# Patient Record
Sex: FEMALE | Race: WHITE | NOT HISPANIC OR LATINO | URBAN - METROPOLITAN AREA
[De-identification: names, ages, dates, MRNs, and addresses within clinical notes are randomized per-mention and may not be internally consistent; named-entity substitution may affect disease eponyms.]

---

## 2020-04-24 ENCOUNTER — INPATIENT (INPATIENT)
Facility: HOSPITAL | Age: 75
LOS: 2 days | Discharge: NURSING, REHAB OR LONG-TERM CARE | DRG: 178 | End: 2020-04-27
Attending: INTERNAL MEDICINE | Admitting: INTERNAL MEDICINE
Payer: MEDICARE

## 2020-04-24 VITALS
RESPIRATION RATE: 18 BRPM | DIASTOLIC BLOOD PRESSURE: 59 MMHG | WEIGHT: 119.93 LBS | SYSTOLIC BLOOD PRESSURE: 93 MMHG | HEIGHT: 61 IN | OXYGEN SATURATION: 97 % | HEART RATE: 82 BPM | TEMPERATURE: 98 F

## 2020-04-24 DIAGNOSIS — F03.90 UNSPECIFIED DEMENTIA WITHOUT BEHAVIORAL DISTURBANCE: ICD-10-CM

## 2020-04-24 DIAGNOSIS — Z29.9 ENCOUNTER FOR PROPHYLACTIC MEASURES, UNSPECIFIED: ICD-10-CM

## 2020-04-24 DIAGNOSIS — I95.9 HYPOTENSION, UNSPECIFIED: ICD-10-CM

## 2020-04-24 DIAGNOSIS — U07.1 COVID-19: ICD-10-CM

## 2020-04-24 LAB
ALBUMIN SERPL ELPH-MCNC: 3.6 G/DL — SIGNIFICANT CHANGE UP (ref 3.5–5)
ALP SERPL-CCNC: 87 U/L — SIGNIFICANT CHANGE UP (ref 40–120)
ALT FLD-CCNC: 27 U/L DA — SIGNIFICANT CHANGE UP (ref 10–60)
ANION GAP SERPL CALC-SCNC: 3 MMOL/L — LOW (ref 5–17)
APPEARANCE UR: CLEAR — SIGNIFICANT CHANGE UP
AST SERPL-CCNC: 40 U/L — SIGNIFICANT CHANGE UP (ref 10–40)
BASOPHILS # BLD AUTO: 0.02 K/UL — SIGNIFICANT CHANGE UP (ref 0–0.2)
BASOPHILS NFR BLD AUTO: 0.4 % — SIGNIFICANT CHANGE UP (ref 0–2)
BILIRUB SERPL-MCNC: 0.3 MG/DL — SIGNIFICANT CHANGE UP (ref 0.2–1.2)
BILIRUB UR-MCNC: NEGATIVE — SIGNIFICANT CHANGE UP
BUN SERPL-MCNC: 14 MG/DL — SIGNIFICANT CHANGE UP (ref 7–18)
CALCIUM SERPL-MCNC: 8.9 MG/DL — SIGNIFICANT CHANGE UP (ref 8.4–10.5)
CHLORIDE SERPL-SCNC: 103 MMOL/L — SIGNIFICANT CHANGE UP (ref 96–108)
CO2 SERPL-SCNC: 32 MMOL/L — HIGH (ref 22–31)
COLOR SPEC: YELLOW — SIGNIFICANT CHANGE UP
CREAT SERPL-MCNC: 0.92 MG/DL — SIGNIFICANT CHANGE UP (ref 0.5–1.3)
D DIMER BLD IA.RAPID-MCNC: <150 NG/ML DDU — SIGNIFICANT CHANGE UP
DIFF PNL FLD: NEGATIVE — SIGNIFICANT CHANGE UP
EOSINOPHIL # BLD AUTO: 0 K/UL — SIGNIFICANT CHANGE UP (ref 0–0.5)
EOSINOPHIL NFR BLD AUTO: 0 % — SIGNIFICANT CHANGE UP (ref 0–6)
GLUCOSE SERPL-MCNC: 89 MG/DL — SIGNIFICANT CHANGE UP (ref 70–99)
GLUCOSE UR QL: NEGATIVE — SIGNIFICANT CHANGE UP
HCT VFR BLD CALC: 36.4 % — SIGNIFICANT CHANGE UP (ref 34.5–45)
HGB BLD-MCNC: 12.3 G/DL — SIGNIFICANT CHANGE UP (ref 11.5–15.5)
IMM GRANULOCYTES NFR BLD AUTO: 0.2 % — SIGNIFICANT CHANGE UP (ref 0–1.5)
KETONES UR-MCNC: NEGATIVE — SIGNIFICANT CHANGE UP
LEUKOCYTE ESTERASE UR-ACNC: NEGATIVE — SIGNIFICANT CHANGE UP
LYMPHOCYTES # BLD AUTO: 1.43 K/UL — SIGNIFICANT CHANGE UP (ref 1–3.3)
LYMPHOCYTES # BLD AUTO: 29.9 % — SIGNIFICANT CHANGE UP (ref 13–44)
MCHC RBC-ENTMCNC: 30.8 PG — SIGNIFICANT CHANGE UP (ref 27–34)
MCHC RBC-ENTMCNC: 33.8 GM/DL — SIGNIFICANT CHANGE UP (ref 32–36)
MCV RBC AUTO: 91 FL — SIGNIFICANT CHANGE UP (ref 80–100)
MONOCYTES # BLD AUTO: 0.51 K/UL — SIGNIFICANT CHANGE UP (ref 0–0.9)
MONOCYTES NFR BLD AUTO: 10.6 % — SIGNIFICANT CHANGE UP (ref 2–14)
NEUTROPHILS # BLD AUTO: 2.82 K/UL — SIGNIFICANT CHANGE UP (ref 1.8–7.4)
NEUTROPHILS NFR BLD AUTO: 58.9 % — SIGNIFICANT CHANGE UP (ref 43–77)
NITRITE UR-MCNC: NEGATIVE — SIGNIFICANT CHANGE UP
NRBC # BLD: 0 /100 WBCS — SIGNIFICANT CHANGE UP (ref 0–0)
PH UR: 7 — SIGNIFICANT CHANGE UP (ref 5–8)
PLATELET # BLD AUTO: 156 K/UL — SIGNIFICANT CHANGE UP (ref 150–400)
POTASSIUM SERPL-MCNC: 4.2 MMOL/L — SIGNIFICANT CHANGE UP (ref 3.5–5.3)
POTASSIUM SERPL-SCNC: 4.2 MMOL/L — SIGNIFICANT CHANGE UP (ref 3.5–5.3)
PROT SERPL-MCNC: 7.5 G/DL — SIGNIFICANT CHANGE UP (ref 6–8.3)
PROT UR-MCNC: NEGATIVE — SIGNIFICANT CHANGE UP
RBC # BLD: 4 M/UL — SIGNIFICANT CHANGE UP (ref 3.8–5.2)
RBC # FLD: 11.6 % — SIGNIFICANT CHANGE UP (ref 10.3–14.5)
SARS-COV-2 RNA SPEC QL NAA+PROBE: DETECTED
SODIUM SERPL-SCNC: 138 MMOL/L — SIGNIFICANT CHANGE UP (ref 135–145)
SP GR SPEC: 1 — LOW (ref 1.01–1.02)
UROBILINOGEN FLD QL: NEGATIVE — SIGNIFICANT CHANGE UP
WBC # BLD: 4.79 K/UL — SIGNIFICANT CHANGE UP (ref 3.8–10.5)
WBC # FLD AUTO: 4.79 K/UL — SIGNIFICANT CHANGE UP (ref 3.8–10.5)

## 2020-04-24 PROCEDURE — 93010 ELECTROCARDIOGRAM REPORT: CPT

## 2020-04-24 PROCEDURE — 71045 X-RAY EXAM CHEST 1 VIEW: CPT | Mod: 26

## 2020-04-24 PROCEDURE — 99285 EMERGENCY DEPT VISIT HI MDM: CPT | Mod: CS

## 2020-04-24 RX ORDER — SODIUM CHLORIDE 9 MG/ML
500 INJECTION INTRAMUSCULAR; INTRAVENOUS; SUBCUTANEOUS ONCE
Refills: 0 | Status: COMPLETED | OUTPATIENT
Start: 2020-04-24 | End: 2020-04-24

## 2020-04-24 RX ORDER — TRAZODONE HCL 50 MG
50 TABLET ORAL EVERY 12 HOURS
Refills: 0 | Status: DISCONTINUED | OUTPATIENT
Start: 2020-04-24 | End: 2020-04-27

## 2020-04-24 RX ORDER — CHOLECALCIFEROL (VITAMIN D3) 125 MCG
1000 CAPSULE ORAL DAILY
Refills: 0 | Status: DISCONTINUED | OUTPATIENT
Start: 2020-04-24 | End: 2020-04-27

## 2020-04-24 RX ORDER — TRAZODONE HCL 50 MG
50 TABLET ORAL ONCE
Refills: 0 | Status: COMPLETED | OUTPATIENT
Start: 2020-04-24 | End: 2020-04-24

## 2020-04-24 RX ORDER — MIRTAZAPINE 45 MG/1
45 TABLET, ORALLY DISINTEGRATING ORAL AT BEDTIME
Refills: 0 | Status: DISCONTINUED | OUTPATIENT
Start: 2020-04-24 | End: 2020-04-27

## 2020-04-24 RX ORDER — ENOXAPARIN SODIUM 100 MG/ML
40 INJECTION SUBCUTANEOUS DAILY
Refills: 0 | Status: DISCONTINUED | OUTPATIENT
Start: 2020-04-24 | End: 2020-04-27

## 2020-04-24 RX ORDER — HALOPERIDOL DECANOATE 100 MG/ML
1 INJECTION INTRAMUSCULAR ONCE
Refills: 0 | Status: COMPLETED | OUTPATIENT
Start: 2020-04-24 | End: 2020-04-24

## 2020-04-24 RX ORDER — FLUVOXAMINE MALEATE 25 MG/1
25 TABLET ORAL AT BEDTIME
Refills: 0 | Status: DISCONTINUED | OUTPATIENT
Start: 2020-04-24 | End: 2020-04-27

## 2020-04-24 RX ORDER — LEVOTHYROXINE SODIUM 125 MCG
25 TABLET ORAL DAILY
Refills: 0 | Status: DISCONTINUED | OUTPATIENT
Start: 2020-04-24 | End: 2020-04-27

## 2020-04-24 RX ORDER — ASCORBIC ACID 60 MG
500 TABLET,CHEWABLE ORAL DAILY
Refills: 0 | Status: DISCONTINUED | OUTPATIENT
Start: 2020-04-24 | End: 2020-04-27

## 2020-04-24 RX ADMIN — Medication 0.5 MILLIGRAM(S): at 16:57

## 2020-04-24 RX ADMIN — Medication 10 MILLIGRAM(S): at 22:59

## 2020-04-24 RX ADMIN — HALOPERIDOL DECANOATE 1 MILLIGRAM(S): 100 INJECTION INTRAMUSCULAR at 18:30

## 2020-04-24 RX ADMIN — MIRTAZAPINE 45 MILLIGRAM(S): 45 TABLET, ORALLY DISINTEGRATING ORAL at 22:59

## 2020-04-24 RX ADMIN — FLUVOXAMINE MALEATE 25 MILLIGRAM(S): 25 TABLET ORAL at 22:59

## 2020-04-24 RX ADMIN — Medication 50 MILLIGRAM(S): at 16:57

## 2020-04-24 RX ADMIN — SODIUM CHLORIDE 500 MILLILITER(S): 9 INJECTION INTRAMUSCULAR; INTRAVENOUS; SUBCUTANEOUS at 16:20

## 2020-04-24 NOTE — H&P ADULT - HISTORY OF PRESENT ILLNESS
Patient is a 74 yr old sent from assisted living for cough. Patient unable to provide ROS Patient is a 74 yr old female from [facility] sent in for evaluation for fever and cough. As per NH home pt has been having cough and cxr with left lower lobe infiltrate. Pt agitated in ED, unable to provide any history. Pt found to be covid positive in ED, but cxr negative for any infiltrate. In ED, Pt vitals were  Temp 97  HR 82  BP 93/59  RR 18/97

## 2020-04-24 NOTE — H&P ADULT - PROBLEM SELECTOR PLAN 2
Pt noted to be hypotensive on admission  s/p IVF BP improved  Encourage increased po intake  Pt clinically appears dehydrated  c/w IVF for 12 hours and reaccess in am

## 2020-04-24 NOTE — ED ADULT NURSE NOTE - NSIMPLEMENTINTERV_GEN_ALL_ED
Implemented All Fall with Harm Risk Interventions:  [place] to call system. Call bell, personal items and telephone within reach. Instruct patient to call for assistance. Room bathroom lighting operational. Non-slip footwear when patient is off stretcher. Physically safe environment: no spills, clutter or unnecessary equipment. Stretcher in lowest position, wheels locked, appropriate side rails in place. Provide visual cue, wrist band, yellow gown, etc. Monitor gait and stability. Monitor for mental status changes and reorient to person, place, and time. Review medications for side effects contributing to fall risk. Reinforce activity limits and safety measures with patient and family. Provide visual clues: red socks.

## 2020-04-24 NOTE — H&P ADULT - ASSESSMENT
Patient is a 74 yr old female from [facility] living sent in for evaluation for fever and cough. As per NH home pt has been having cough and cxr with left lower lobe infiltrate. Pt agitated in ED, unable to provide any history. Pt found to be covid positive in ED, but cxr negative for any infiltrate. In ED, Pt vitals were  Temp 97  HR 82  BP 93/59  RR 18/97

## 2020-04-24 NOTE — H&P ADULT - PROBLEM SELECTOR PLAN 1
Pt sent in for evaluation of cough  cxr negative for any infiltrate, pt currently saturating 98% on RA  F/u inflamatory markers in am  Holding plaquinel at present as no signs of hypoxia

## 2020-04-24 NOTE — ED PROVIDER NOTE - CARE PLAN
Principal Discharge DX:	Hypotension, unspecified hypotension type  Secondary Diagnosis:	Upper respiratory tract infection, unspecified type

## 2020-04-24 NOTE — H&P ADULT - NSHPPHYSICALEXAM_GEN_ALL_CORE
PHYSICAL EXAM:  GENERAL: No acute distess  HEAD:  Atraumatic, Normocephalic  EYES:  conjunctiva and sclera clear  NECK: Supple, No JVD  CHEST/LUNG: Clear to auscultation bilaterally; No wheeze; No crackles; No accessory muscles used  HEART: Regular rate and rhythm; No murmurs;   ABDOMEN: Soft, Nontender, Nondistended; Bowel sounds present; No guarding  EXTREMITIES:  1+ Peripheral Pulses, No cyanosis or edema  PSYCH: Confused  NEUROLOGY: non-focal  SKIN: No rashes or lesions

## 2020-04-24 NOTE — ED ADULT NURSE NOTE - NSSUHOSCREENINGYN_ED_ALL_ED
Patient will be participating in weekly tobacco cessation meetings and will begin the prescribed tobacco cessation medication regimen of 21 mg patches. FTND of 5 indicates a moderate dependence to tobacco. NATALIE-D of 10 is perceived as no mental distress or depression at this time. 
No - the patient is unable to be screened due to medical condition

## 2020-04-24 NOTE — H&P ADULT - ATTENDING COMMENTS
seen and examined in er d/w er md  brought in cough and fever  in er, pt is confused sating well on RA  physical done  confused  has behavioural issues   covid pos  will watch

## 2020-04-25 LAB
ALBUMIN SERPL ELPH-MCNC: 3.3 G/DL — LOW (ref 3.5–5)
ALP SERPL-CCNC: 88 U/L — SIGNIFICANT CHANGE UP (ref 40–120)
ALT FLD-CCNC: 23 U/L DA — SIGNIFICANT CHANGE UP (ref 10–60)
ANION GAP SERPL CALC-SCNC: 7 MMOL/L — SIGNIFICANT CHANGE UP (ref 5–17)
AST SERPL-CCNC: 37 U/L — SIGNIFICANT CHANGE UP (ref 10–40)
BILIRUB SERPL-MCNC: 0.4 MG/DL — SIGNIFICANT CHANGE UP (ref 0.2–1.2)
BUN SERPL-MCNC: 9 MG/DL — SIGNIFICANT CHANGE UP (ref 7–18)
CALCIUM SERPL-MCNC: 8.5 MG/DL — SIGNIFICANT CHANGE UP (ref 8.4–10.5)
CHLORIDE SERPL-SCNC: 106 MMOL/L — SIGNIFICANT CHANGE UP (ref 96–108)
CHOLEST SERPL-MCNC: 130 MG/DL — SIGNIFICANT CHANGE UP (ref 10–199)
CO2 SERPL-SCNC: 25 MMOL/L — SIGNIFICANT CHANGE UP (ref 22–31)
CREAT SERPL-MCNC: 0.74 MG/DL — SIGNIFICANT CHANGE UP (ref 0.5–1.3)
CRP SERPL-MCNC: 0.57 MG/DL — HIGH (ref 0–0.4)
CULTURE RESULTS: SIGNIFICANT CHANGE UP
FERRITIN SERPL-MCNC: 276 NG/ML — HIGH (ref 15–150)
FERRITIN SERPL-MCNC: 291 NG/ML — HIGH (ref 15–150)
GLUCOSE SERPL-MCNC: SIGNIFICANT CHANGE UP MG/DL (ref 70–99)
HCT VFR BLD CALC: 37.9 % — SIGNIFICANT CHANGE UP (ref 34.5–45)
HCV AB S/CO SERPL IA: 0.13 S/CO — SIGNIFICANT CHANGE UP (ref 0–0.99)
HCV AB SERPL-IMP: SIGNIFICANT CHANGE UP
HDLC SERPL-MCNC: 40 MG/DL — LOW
HGB BLD-MCNC: 12.5 G/DL — SIGNIFICANT CHANGE UP (ref 11.5–15.5)
LIPID PNL WITH DIRECT LDL SERPL: 63 MG/DL — SIGNIFICANT CHANGE UP
MAGNESIUM SERPL-MCNC: 3.8 MG/DL — HIGH (ref 1.6–2.6)
MAGNESIUM SERPL-MCNC: <0.3 MG/DL — CRITICAL LOW (ref 1.6–2.6)
MCHC RBC-ENTMCNC: 30.1 PG — SIGNIFICANT CHANGE UP (ref 27–34)
MCHC RBC-ENTMCNC: 33 GM/DL — SIGNIFICANT CHANGE UP (ref 32–36)
MCV RBC AUTO: 91.3 FL — SIGNIFICANT CHANGE UP (ref 80–100)
NRBC # BLD: 0 /100 WBCS — SIGNIFICANT CHANGE UP (ref 0–0)
PHOSPHATE SERPL-MCNC: SIGNIFICANT CHANGE UP MG/DL (ref 2.5–4.5)
PLATELET # BLD AUTO: 126 K/UL — LOW (ref 150–400)
POTASSIUM SERPL-MCNC: 3.9 MMOL/L — SIGNIFICANT CHANGE UP (ref 3.5–5.3)
POTASSIUM SERPL-SCNC: 3.9 MMOL/L — SIGNIFICANT CHANGE UP (ref 3.5–5.3)
PROCALCITONIN SERPL-MCNC: 0.09 NG/ML — SIGNIFICANT CHANGE UP (ref 0.02–0.1)
PROCALCITONIN SERPL-MCNC: 0.09 NG/ML — SIGNIFICANT CHANGE UP (ref 0.02–0.1)
PROT SERPL-MCNC: 7 G/DL — SIGNIFICANT CHANGE UP (ref 6–8.3)
RBC # BLD: 4.15 M/UL — SIGNIFICANT CHANGE UP (ref 3.8–5.2)
RBC # FLD: 11.6 % — SIGNIFICANT CHANGE UP (ref 10.3–14.5)
SODIUM SERPL-SCNC: 138 MMOL/L — SIGNIFICANT CHANGE UP (ref 135–145)
SPECIMEN SOURCE: SIGNIFICANT CHANGE UP
TOTAL CHOLESTEROL/HDL RATIO MEASUREMENT: 3.2 RATIO — LOW (ref 3.3–7.1)
TRIGL SERPL-MCNC: 134 MG/DL — SIGNIFICANT CHANGE UP (ref 10–149)
TSH SERPL-MCNC: 1.57 UU/ML — SIGNIFICANT CHANGE UP (ref 0.34–4.82)
TSH SERPL-MCNC: 1.63 UU/ML — SIGNIFICANT CHANGE UP (ref 0.34–4.82)
WBC # BLD: 2.99 K/UL — LOW (ref 3.8–10.5)
WBC # FLD AUTO: 2.99 K/UL — LOW (ref 3.8–10.5)

## 2020-04-25 RX ORDER — LANOLIN ALCOHOL/MO/W.PET/CERES
3 CREAM (GRAM) TOPICAL AT BEDTIME
Refills: 0 | Status: DISCONTINUED | OUTPATIENT
Start: 2020-04-25 | End: 2020-04-27

## 2020-04-25 RX ORDER — HALOPERIDOL DECANOATE 100 MG/ML
0.5 INJECTION INTRAMUSCULAR ONCE
Refills: 0 | Status: COMPLETED | OUTPATIENT
Start: 2020-04-25 | End: 2020-04-25

## 2020-04-25 RX ORDER — MAGNESIUM OXIDE 400 MG ORAL TABLET 241.3 MG
400 TABLET ORAL DAILY
Refills: 0 | Status: DISCONTINUED | OUTPATIENT
Start: 2020-04-25 | End: 2020-04-25

## 2020-04-25 RX ORDER — MAGNESIUM SULFATE 500 MG/ML
2 VIAL (ML) INJECTION
Refills: 0 | Status: COMPLETED | OUTPATIENT
Start: 2020-04-25 | End: 2020-04-25

## 2020-04-25 RX ORDER — LANOLIN ALCOHOL/MO/W.PET/CERES
5 CREAM (GRAM) TOPICAL ONCE
Refills: 0 | Status: DISCONTINUED | OUTPATIENT
Start: 2020-04-25 | End: 2020-04-25

## 2020-04-25 RX ORDER — HALOPERIDOL DECANOATE 100 MG/ML
1 INJECTION INTRAMUSCULAR ONCE
Refills: 0 | Status: COMPLETED | OUTPATIENT
Start: 2020-04-25 | End: 2020-04-25

## 2020-04-25 RX ADMIN — Medication 10 MILLIGRAM(S): at 05:42

## 2020-04-25 RX ADMIN — ENOXAPARIN SODIUM 40 MILLIGRAM(S): 100 INJECTION SUBCUTANEOUS at 11:49

## 2020-04-25 RX ADMIN — Medication 50 MILLIGRAM(S): at 05:42

## 2020-04-25 RX ADMIN — HALOPERIDOL DECANOATE 0.5 MILLIGRAM(S): 100 INJECTION INTRAMUSCULAR at 00:53

## 2020-04-25 RX ADMIN — Medication 10 MILLIGRAM(S): at 21:48

## 2020-04-25 RX ADMIN — MIRTAZAPINE 45 MILLIGRAM(S): 45 TABLET, ORALLY DISINTEGRATING ORAL at 21:48

## 2020-04-25 RX ADMIN — FLUVOXAMINE MALEATE 25 MILLIGRAM(S): 25 TABLET ORAL at 21:47

## 2020-04-25 RX ADMIN — Medication 10 MILLIGRAM(S): at 14:47

## 2020-04-25 RX ADMIN — Medication 500 MILLIGRAM(S): at 11:48

## 2020-04-25 RX ADMIN — MAGNESIUM OXIDE 400 MG ORAL TABLET 400 MILLIGRAM(S): 241.3 TABLET ORAL at 11:48

## 2020-04-25 RX ADMIN — Medication 50 GRAM(S): at 09:17

## 2020-04-25 RX ADMIN — Medication 25 MICROGRAM(S): at 05:42

## 2020-04-25 RX ADMIN — Medication 50 MILLIGRAM(S): at 20:55

## 2020-04-25 RX ADMIN — Medication 50 GRAM(S): at 10:34

## 2020-04-25 RX ADMIN — HALOPERIDOL DECANOATE 1 MILLIGRAM(S): 100 INJECTION INTRAMUSCULAR at 16:16

## 2020-04-25 RX ADMIN — Medication 3 MILLIGRAM(S): at 21:48

## 2020-04-25 RX ADMIN — Medication 1000 UNIT(S): at 11:49

## 2020-04-25 NOTE — PROGRESS NOTE ADULT - SUBJECTIVE AND OBJECTIVE BOX
HPI:  Patient is a 74 yr old female from [facility] living sent in for evaluation for fever and cough. As per NH home pt has been having cough and cxr with left lower lobe infiltrate. Pt agitated in ED, unable to provide any history. Pt found to be covid positive in ED, but cxr negative for any infiltrate. In ED, Pt vitals were  Temp 97  HR 82  BP 93/59  RR 18/97 (2020 20:36)      Patient is a 74y old  Female who presents with a chief complaint of Cough (2020 20:36)      INTERVAL HPI/OVERNIGHT EVENTS:  T(C): 36.2 (--20 @ 14:16), Max: 36.6 (-20 @ 22:16)  HR: 90 (-25-20 @ 14:16) (67 - 90)  BP: 113/59 (-25-20 @ 14:16) (101/62 - 122/61)  RR: 18 (-25-20 @ 14:16) (16 - 18)  SpO2: 96% (--20 @ 14:16) (96% - 99%)  Wt(kg): --  I&O's Summary      REVIEW OF SYSTEMS: denies fever, chills, SOB, palpitations, chest pain, abdominal pain, nausea, vomitting, diarrhea, constipation, dizziness    MEDICATIONS  (STANDING):  ascorbic acid 500 milliGRAM(s) Oral daily  busPIRone 10 milliGRAM(s) Oral three times a day  cholecalciferol 1000 Unit(s) Oral daily  enoxaparin Injectable 40 milliGRAM(s) SubCutaneous daily  fluvoxaMINE 25 milliGRAM(s) Oral at bedtime  levothyroxine 25 MICROGram(s) Oral daily  melatonin 3 milliGRAM(s) Oral at bedtime  mirtazapine 45 milliGRAM(s) Oral at bedtime  traZODone 50 milliGRAM(s) Oral every 12 hours    MEDICATIONS  (PRN):      PHYSICAL EXAM:  GENERAL: NAD, well-groomed, well-developed  HEAD:  Atraumatic, Normocephalic  EYES: EOMI, PERRLA, conjunctiva and sclera clear  ENMT: No tonsillar erythema, exudates, or enlargement; Moist mucous membranes, Good dentition, No lesions  NECK: Supple, No JVD, Normal thyroid  NERVOUS SYSTEM:  Alert & Oriented X3, Good concentration; Motor Strength 5/5 B/L upper and lower extremities; DTRs 2+ intact and symmetric  CHEST/LUNG: Clear to percussion bilaterally; No rales, rhonchi, wheezing, or rubs  HEART: Regular rate and rhythm; No murmurs, rubs, or gallops  ABDOMEN: Soft, Nontender, Nondistended; Bowel sounds present  EXTREMITIES:  2+ Peripheral Pulses, No clubbing, cyanosis, or edema  LYMPH: No lymphadenopathy noted  SKIN: No rashes or lesions  LABS:                        12.5   2.99  )-----------( 126      ( 2020 07:32 )             37.9     04-25    138  |  106  |  9   --------------   --------------<  QNS  3.9   |  25  |  0.74    Ca    8.5      2020 07:32  Phos  QNS     04-25  Mg     3.8     04-25    TPro  7.0  /  Alb  3.3<L>  /  TBili  0.4  /  DBili  x   /  AST  37  /  ALT  23  /  AlkPhos  88  04-25      Urinalysis Basic - ( 2020 16:32 )    Color: Yellow / Appearance: Clear / S.005 / pH: x  Gluc: x / Ketone: Negative  / Bili: Negative / Urobili: Negative   Blood: x / Protein: Negative / Nitrite: Negative   Leuk Esterase: Negative / RBC: x / WBC x   Sq Epi: x / Non Sq Epi: x / Bacteria: x      CAPILLARY BLOOD GLUCOSE            Urinalysis Basic - ( 2020 16:32 )    Color: Yellow / Appearance: Clear / S.005 / pH: x  Gluc: x / Ketone: Negative  / Bili: Negative / Urobili: Negative   Blood: x / Protein: Negative / Nitrite: Negative   Leuk Esterase: Negative / RBC: x / WBC x   Sq Epi: x / Non Sq Epi: x / Bacteria: x

## 2020-04-25 NOTE — CHART NOTE - NSCHARTNOTEFT_GEN_A_CORE
Event:  Called by RN this afternoon, patient very agitated and climbing out of bed.     Brief HPI:  74 yr old female with history of dementia, from [facility] sent in for evaluation for fever and cough. Pt found to be covid positive in ED.  Patient 02 sat >94% on roomair.  Afebrile.     Chest Xray 04/24/2020    INTERPRETATION:    Examination: XR CHES  Findings:  The lungs are clear.  Heart size andmediastinum unremarkable. Trachea midline.  Osseous thorax intact.  Impression:  1.  No acute cardiopulmonary radiographic chest pathology.        Assessment:  Patient Alert to person only.  Restless and agitated, trying to get out of bed. Tried to calm and reorient patient but she is confused.     Plan:   -Bed alarm / fall precautions   -Haldol 1 mg IVP x 1 dose ordered  -B/L wrist restraints for safety, high risk for fall.  Re-assess need for restraint after Haldol given.  -Continue current psych meds from home (Trazadone, Remeron, Buspar)

## 2020-04-25 NOTE — CHART NOTE - NSCHARTNOTEFT_GEN_A_CORE
EVENT: Poor safety awareness, climbing OOB with side rails up, confused    BRIEF HPI:74 yr old female from [facility] living sent in for evaluation for fever and cough. As per NH home pt has been having cough and cxr with left lower lobe infiltrate. Pt agitated in ED, unable to provide any history. Pt found to be covid positive, cxr negative for any infiltrate. Holding Plaquenil at present as no signs of hypoxia.Now climbing OOB with no safety awareness.    Vital Signs Last 24 Hrs  T(C): 36.6 (24 Apr 2020 22:16), Max: 36.6 (24 Apr 2020 22:16)  T(F): 97.8 (24 Apr 2020 22:16), Max: 97.8 (24 Apr 2020 22:16)  HR: 67 (24 Apr 2020 22:16) (67 - 82)  BP: 122/61 (24 Apr 2020 22:16) (93/59 - 122/61)  BP(mean): --  RR: 18 (24 Apr 2020 22:16) (16 - 18)  SpO2: 99% (24 Apr 2020 22:16) (97% - 99%)    BRIEF ASSESSMENT  NEURO: Confused climbing OOB    MEDICATIONS  (STANDING):  ascorbic acid 500 milliGRAM(s) Oral daily  busPIRone 10 milliGRAM(s) Oral three times a day  cholecalciferol 1000 Unit(s) Oral daily  enoxaparin Injectable 40 milliGRAM(s) SubCutaneous daily  fluvoxaMINE 25 milliGRAM(s) Oral at bedtime  haloperidol    Injectable 0.5 milliGRAM(s) IV Push once  levothyroxine 25 MICROGram(s) Oral daily    mirtazapine 45 milliGRAM(s) Oral at bedtime  traZODone 50 milliGRAM(s) Oral every 12 hours    MEDICATIONS  (PRN): EVENT: Called by nurse to assess pt for poor safety awareness, climbing OOB with side rails up, confused    BRIEF HPI:74 yr old female from [facility] sent in for evaluation for fever and cough. As per NH home pt has been having cough and cxr with left lower lobe infiltrate. Pt agitated in ED, unable to provide any history. Pt found to be covid positive, cxr negative for any infiltrate. Holding Plaquenil at present as no signs of hypoxia. Now climbing OOB with no safety awareness.    Vital Signs Last 24 Hrs  T(C): 36.6 (24 Apr 2020 22:16), Max: 36.6 (24 Apr 2020 22:16)  T(F): 97.8 (24 Apr 2020 22:16), Max: 97.8 (24 Apr 2020 22:16)  HR: 67 (24 Apr 2020 22:16) (67 - 82)  BP: 122/61 (24 Apr 2020 22:16) (93/59 - 122/61)  BP(mean): --  RR: 18 (24 Apr 2020 22:16) (16 - 18)  SpO2: 99% (24 Apr 2020 22:16) (97% - 99%)    BRIEF ASSESSMENT  NEURO: Confused, climbing OOB  EXT: AROM, unsteady gait                          12.3   4.79  )-----------( 156      ( 24 Apr 2020 16:12 )             36.4       04-24    138  |  103  |  14  ----------------------------<  89  4.2   |  32<H>  |  0.92    Ca    8.9      24 Apr 2020 16:12    TPro  7.5  /  Alb  3.6  /  TBili  0.3  /  DBili  x   /  AST  40  /  ALT  27  /  AlkPhos  87  04-24    EKG: NSR  QTc 437 ms    COVID-19 PCR: Detected:  (04.24.20 @ 16:15)    EXAM:  XR CHEST AP OR PA 1V                        PROCEDURE DATE:  04/24/2020    INTERPRETATION:    Examination: XR CHEST  History: , Shortness of Breath, Cough,  Fever  Comparison: None  Findings:  The lungs are clear.  Heart size and mediastinum unremarkable. Trachea midline.  Osseous thorax intact.  Impression:  1.  No acute cardiopulmonary radiographic chest pathology.    PROBLEM: Agitation probably due to dementia  PLAN  1. Haloperidol Injectable 0.5 alvarado GRAM(s) IV Push once ordered  2. Cont buspirone 10 alvarado GRAM(s) Oral three times a day  3. Cont fluvoxamine 25 alvarado GRAM(s) Oral at bedtime  4. Cont mirtazapine 45 alvarado GRAM(s) Oral at bedtime  5. Cont trazodone 50 alvarado GRAM(s) Oral every 12 hours

## 2020-04-26 LAB
ALBUMIN SERPL ELPH-MCNC: 3.8 G/DL — SIGNIFICANT CHANGE UP (ref 3.5–5)
ALP SERPL-CCNC: 101 U/L — SIGNIFICANT CHANGE UP (ref 40–120)
ALT FLD-CCNC: 31 U/L DA — SIGNIFICANT CHANGE UP (ref 10–60)
ANION GAP SERPL CALC-SCNC: 9 MMOL/L — SIGNIFICANT CHANGE UP (ref 5–17)
AST SERPL-CCNC: 66 U/L — HIGH (ref 10–40)
BILIRUB SERPL-MCNC: 0.4 MG/DL — SIGNIFICANT CHANGE UP (ref 0.2–1.2)
BUN SERPL-MCNC: 12 MG/DL — SIGNIFICANT CHANGE UP (ref 7–18)
CALCIUM SERPL-MCNC: 8.8 MG/DL — SIGNIFICANT CHANGE UP (ref 8.4–10.5)
CHLORIDE SERPL-SCNC: 105 MMOL/L — SIGNIFICANT CHANGE UP (ref 96–108)
CO2 SERPL-SCNC: 25 MMOL/L — SIGNIFICANT CHANGE UP (ref 22–31)
CREAT SERPL-MCNC: 0.85 MG/DL — SIGNIFICANT CHANGE UP (ref 0.5–1.3)
GLUCOSE SERPL-MCNC: 124 MG/DL — HIGH (ref 70–99)
HCT VFR BLD CALC: 38.7 % — SIGNIFICANT CHANGE UP (ref 34.5–45)
HGB BLD-MCNC: 13.2 G/DL — SIGNIFICANT CHANGE UP (ref 11.5–15.5)
MAGNESIUM SERPL-MCNC: 2.6 MG/DL — SIGNIFICANT CHANGE UP (ref 1.6–2.6)
MCHC RBC-ENTMCNC: 29.8 PG — SIGNIFICANT CHANGE UP (ref 27–34)
MCHC RBC-ENTMCNC: 34.1 GM/DL — SIGNIFICANT CHANGE UP (ref 32–36)
MCV RBC AUTO: 87.4 FL — SIGNIFICANT CHANGE UP (ref 80–100)
NRBC # BLD: 0 /100 WBCS — SIGNIFICANT CHANGE UP (ref 0–0)
PHOSPHATE SERPL-MCNC: 2.2 MG/DL — LOW (ref 2.5–4.5)
PLATELET # BLD AUTO: 146 K/UL — LOW (ref 150–400)
POTASSIUM SERPL-MCNC: 3.6 MMOL/L — SIGNIFICANT CHANGE UP (ref 3.5–5.3)
POTASSIUM SERPL-SCNC: 3.6 MMOL/L — SIGNIFICANT CHANGE UP (ref 3.5–5.3)
PROT SERPL-MCNC: 7.8 G/DL — SIGNIFICANT CHANGE UP (ref 6–8.3)
RBC # BLD: 4.43 M/UL — SIGNIFICANT CHANGE UP (ref 3.8–5.2)
RBC # FLD: 11.4 % — SIGNIFICANT CHANGE UP (ref 10.3–14.5)
SODIUM SERPL-SCNC: 139 MMOL/L — SIGNIFICANT CHANGE UP (ref 135–145)
WBC # BLD: 6.72 K/UL — SIGNIFICANT CHANGE UP (ref 3.8–10.5)
WBC # FLD AUTO: 6.72 K/UL — SIGNIFICANT CHANGE UP (ref 3.8–10.5)

## 2020-04-26 RX ORDER — SODIUM CHLORIDE 9 MG/ML
1000 INJECTION INTRAMUSCULAR; INTRAVENOUS; SUBCUTANEOUS
Refills: 0 | Status: DISCONTINUED | OUTPATIENT
Start: 2020-04-26 | End: 2020-04-27

## 2020-04-26 RX ADMIN — Medication 50 MILLIGRAM(S): at 17:24

## 2020-04-26 RX ADMIN — Medication 10 MILLIGRAM(S): at 06:20

## 2020-04-26 RX ADMIN — SODIUM CHLORIDE 75 MILLILITER(S): 9 INJECTION INTRAMUSCULAR; INTRAVENOUS; SUBCUTANEOUS at 12:50

## 2020-04-26 RX ADMIN — Medication 1000 UNIT(S): at 10:55

## 2020-04-26 RX ADMIN — Medication 500 MILLIGRAM(S): at 10:55

## 2020-04-26 RX ADMIN — Medication 25 MICROGRAM(S): at 06:20

## 2020-04-26 RX ADMIN — Medication 10 MILLIGRAM(S): at 14:18

## 2020-04-26 RX ADMIN — Medication 10 MILLIGRAM(S): at 21:53

## 2020-04-26 RX ADMIN — MIRTAZAPINE 45 MILLIGRAM(S): 45 TABLET, ORALLY DISINTEGRATING ORAL at 21:52

## 2020-04-26 RX ADMIN — Medication 50 MILLIGRAM(S): at 06:20

## 2020-04-26 RX ADMIN — Medication 3 MILLIGRAM(S): at 21:53

## 2020-04-26 RX ADMIN — ENOXAPARIN SODIUM 40 MILLIGRAM(S): 100 INJECTION SUBCUTANEOUS at 10:55

## 2020-04-26 RX ADMIN — FLUVOXAMINE MALEATE 25 MILLIGRAM(S): 25 TABLET ORAL at 21:52

## 2020-04-27 VITALS
OXYGEN SATURATION: 98 % | RESPIRATION RATE: 18 BRPM | SYSTOLIC BLOOD PRESSURE: 137 MMHG | DIASTOLIC BLOOD PRESSURE: 79 MMHG | HEART RATE: 100 BPM | TEMPERATURE: 98 F

## 2020-04-27 LAB
ALBUMIN SERPL ELPH-MCNC: 3.2 G/DL — LOW (ref 3.5–5)
ALP SERPL-CCNC: 86 U/L — SIGNIFICANT CHANGE UP (ref 40–120)
ALT FLD-CCNC: 40 U/L DA — SIGNIFICANT CHANGE UP (ref 10–60)
ANION GAP SERPL CALC-SCNC: 11 MMOL/L — SIGNIFICANT CHANGE UP (ref 5–17)
AST SERPL-CCNC: 115 U/L — HIGH (ref 10–40)
BILIRUB SERPL-MCNC: 0.5 MG/DL — SIGNIFICANT CHANGE UP (ref 0.2–1.2)
BUN SERPL-MCNC: 13 MG/DL — SIGNIFICANT CHANGE UP (ref 7–18)
CALCIUM SERPL-MCNC: 8.4 MG/DL — SIGNIFICANT CHANGE UP (ref 8.4–10.5)
CHLORIDE SERPL-SCNC: 107 MMOL/L — SIGNIFICANT CHANGE UP (ref 96–108)
CO2 SERPL-SCNC: 23 MMOL/L — SIGNIFICANT CHANGE UP (ref 22–31)
CREAT SERPL-MCNC: 0.6 MG/DL — SIGNIFICANT CHANGE UP (ref 0.5–1.3)
GLUCOSE SERPL-MCNC: 97 MG/DL — SIGNIFICANT CHANGE UP (ref 70–99)
HCT VFR BLD CALC: 40.5 % — SIGNIFICANT CHANGE UP (ref 34.5–45)
HGB BLD-MCNC: 13.7 G/DL — SIGNIFICANT CHANGE UP (ref 11.5–15.5)
MAGNESIUM SERPL-MCNC: 2.4 MG/DL — SIGNIFICANT CHANGE UP (ref 1.6–2.6)
MCHC RBC-ENTMCNC: 29.6 PG — SIGNIFICANT CHANGE UP (ref 27–34)
MCHC RBC-ENTMCNC: 33.8 GM/DL — SIGNIFICANT CHANGE UP (ref 32–36)
MCV RBC AUTO: 87.5 FL — SIGNIFICANT CHANGE UP (ref 80–100)
NRBC # BLD: 0 /100 WBCS — SIGNIFICANT CHANGE UP (ref 0–0)
PHOSPHATE SERPL-MCNC: 2.2 MG/DL — LOW (ref 2.5–4.5)
PLATELET # BLD AUTO: 131 K/UL — LOW (ref 150–400)
POTASSIUM SERPL-MCNC: 3.3 MMOL/L — LOW (ref 3.5–5.3)
POTASSIUM SERPL-SCNC: 3.3 MMOL/L — LOW (ref 3.5–5.3)
PROT SERPL-MCNC: 7.2 G/DL — SIGNIFICANT CHANGE UP (ref 6–8.3)
RBC # BLD: 4.63 M/UL — SIGNIFICANT CHANGE UP (ref 3.8–5.2)
RBC # FLD: 11.7 % — SIGNIFICANT CHANGE UP (ref 10.3–14.5)
SODIUM SERPL-SCNC: 141 MMOL/L — SIGNIFICANT CHANGE UP (ref 135–145)
WBC # BLD: 5.87 K/UL — SIGNIFICANT CHANGE UP (ref 3.8–10.5)
WBC # FLD AUTO: 5.87 K/UL — SIGNIFICANT CHANGE UP (ref 3.8–10.5)

## 2020-04-27 RX ORDER — POTASSIUM CHLORIDE 20 MEQ
20 PACKET (EA) ORAL ONCE
Refills: 0 | Status: COMPLETED | OUTPATIENT
Start: 2020-04-27 | End: 2020-04-27

## 2020-04-27 RX ORDER — POTASSIUM PHOSPHATE, MONOBASIC POTASSIUM PHOSPHATE, DIBASIC 236; 224 MG/ML; MG/ML
15 INJECTION, SOLUTION INTRAVENOUS ONCE
Refills: 0 | Status: COMPLETED | OUTPATIENT
Start: 2020-04-27 | End: 2020-04-27

## 2020-04-27 RX ADMIN — Medication 1000 UNIT(S): at 11:30

## 2020-04-27 RX ADMIN — Medication 25 MICROGRAM(S): at 06:38

## 2020-04-27 RX ADMIN — Medication 500 MILLIGRAM(S): at 11:30

## 2020-04-27 RX ADMIN — Medication 20 MILLIEQUIVALENT(S): at 11:29

## 2020-04-27 RX ADMIN — Medication 10 MILLIGRAM(S): at 06:38

## 2020-04-27 RX ADMIN — Medication 50 MILLIGRAM(S): at 17:51

## 2020-04-27 RX ADMIN — Medication 50 MILLIGRAM(S): at 06:38

## 2020-04-27 RX ADMIN — ENOXAPARIN SODIUM 40 MILLIGRAM(S): 100 INJECTION SUBCUTANEOUS at 11:30

## 2020-04-27 RX ADMIN — POTASSIUM PHOSPHATE, MONOBASIC POTASSIUM PHOSPHATE, DIBASIC 62.5 MILLIMOLE(S): 236; 224 INJECTION, SOLUTION INTRAVENOUS at 11:30

## 2020-04-27 RX ADMIN — Medication 10 MILLIGRAM(S): at 17:48

## 2020-04-27 NOTE — PROGRESS NOTE ADULT - ASSESSMENT
_________________________________________________________________________________________  ========>>  M E D I C A L   A T T E N D I N G    F O L L O W  U P  N O T E  <<=========  -----------------------------------------------------------------------------------------------------    - Patient seen and examined by me earlier today.  (covering today)   - In summary,  SUSANA MADRID is a 74y year old woman who originally presented with cough   - Patient today overall doing fairly, comfortable, eating fairly       reportedly at times agitated , pt with restraints ..    ==================>> REVIEW OF SYSTEM <<=================    limited ROS, pt poor historian and confused     ==================>> PHYSICAL EXAM <<=================    GEN: Awake and alert, confused, agitated at times, otherwise NAD / comfortable  HEENT: NCAT, PERRL, dry mucosa, hearing intact, cachectic   Neck: supple , no JVD appreciated  CVS: S1S2 , regular , No M/R/G appreciated  PULM: CTA B/L,  limited exam as not taking deep breaths   ABD.: soft. non tender  Extrem: intact pulses , no edema , restraints on   PSYCH : anxious / agitated      ==================>> MEDICATIONS <<====================    MEDICATIONS  (STANDING):  ascorbic acid 500 milliGRAM(s) Oral daily  busPIRone 10 milliGRAM(s) Oral three times a day  cholecalciferol 1000 Unit(s) Oral daily  enoxaparin Injectable 40 milliGRAM(s) SubCutaneous daily  fluvoxaMINE 25 milliGRAM(s) Oral at bedtime  levothyroxine 25 MICROGram(s) Oral daily  melatonin 3 milliGRAM(s) Oral at bedtime  mirtazapine 45 milliGRAM(s) Oral at bedtime  sodium chloride 0.9%. 1000 milliLiter(s) (75 mL/Hr) IV Continuous <Continuous>  traZODone 50 milliGRAM(s) Oral every 12 hours    ==================>> VITAL SIGNS <<==================    T(C): 36.6 (20 @ 05:30), Max: 36.7 (20 @ 21:28)  HR: 100 (20 @ 05:30) (90 - 100)  BP: 132/69 (20 @ 05:30) (113/59 - 142/66)  RR: 20 (20 @ 05:30) (18 - 20)  SpO2: 98% (20 @ 05:30) (95% - 98%)     I&O's Summary    2020 07:01  -  2020 07:00  --------------------------------------------------------  IN: 40 mL / OUT: 0 mL / NET: 40 mL     ==================>> LAB AND IMAGING <<==================                        13.2   6.72  )-----------( 146      ( 2020 07:20 )             38.7            139  |  105  |  12  ----------------------------<  124<H>  3.6   |  25  |  0.85    Ca    8.8      2020 07:20  Phos  2.2       Mg     2.6         TPro  7.8  /  Alb  3.8  /  TBili  0.4  /  DBili  x   /  AST  66<H>  /  ALT  31  /  AlkPhos  101        Urinalysis Basic - ( 2020 16:32 )  Color: Yellow / Appearance: Clear / S.005 / pH: x  Gluc: x / Ketone: Negative  / Bili: Negative / Urobili: Negative   Blood: x / Protein: Negative / Nitrite: Negative   Leuk Esterase: Negative / RBC: x / WBC x   Sq Epi: x / Non Sq Epi: x / Bacteria: x    TSH:      1.63   (20)           Lipid profile:  (20)     Total: 130     LDL  : 63     HDL  :40     TG   :134     COVID-19 PCR: Detected: This test has been validated by [employer] to be accurate;  though it has not been FDA cleared/approved by the usual pathway  As with all laboratory test, results should be correlated with clinical  findings.  https://www.fda.gov/media/311246/download  https://www.fda.gov/media/732926/download (20 @ 16:15)    ___________________________________________________________________________________  ===============>>  A S S E S S M E N T   A N D   P L A N <<===============  ------------------------------------------------------------------------------------------    pt with covid, agitation/behavioral issues  consider psych evaluation  supportive care for COVID  O2 as needed  encourage Po intake with aspiration predications    -GI/DVT Prophylaxis.  - given new finding / recommendations / guidelines regarding treatment of COVID-19 (2020, [facility] guidelines), pt should be on higher dose anticoagulation for prevention and treatment of possible microemboli ( general recom is for Lovenox 40 mg or Xarelto 10 mg).    monitor closely for bleeding or other complications.     ___________________________  H. JIE Nogueira.  (covering today)   Pager: [phone]
seen and examined  confused has behavioural problems   vs stable afebrile not in any resp distress    bp ok   covid positive  will watch her
seen and examined vsstable afebrile physical unchanged  lungs clear   no cp or sob  comfortable on bed and saturating well without O2  ( RA)  k 3.3   pt is from [facility]  i called them they dont accept covid pt   dpoke to   she will speak to Pts son for NURSING HOME

## 2020-04-27 NOTE — DISCHARGE NOTE NURSING/CASE MANAGEMENT/SOCIAL WORK - NSDCPEPT PROEDMA_GEN_ALL_CORE
Yes
You can access the [facility] Patient Portal, offered by [facility], by registering with the following website: [email or web address]

## 2020-04-27 NOTE — PROGRESS NOTE ADULT - SUBJECTIVE AND OBJECTIVE BOX
HPI:  Patient is a 74 yr old female from [facility] sent in for evaluation for fever and cough. As per NH home pt has been having cough and cxr with left lower lobe infiltrate. Pt agitated in ED, unable to provide any history. Pt found to be covid positive in ED, but cxr negative for any infiltrate. In ED, Pt vitals were  Temp 97  HR 82  BP 93/59  RR 18/97 (24 Apr 2020 20:36)      Patient is a 74y old  Female who presents with a chief complaint of Cough (27 Apr 2020 09:45)      INTERVAL HPI/OVERNIGHT EVENTS:  T(C): 36.3 (04-27-20 @ 05:11), Max: 36.8 (04-26-20 @ 21:02)  HR: 87 (04-27-20 @ 05:11) (86 - 97)  BP: 122/67 (04-27-20 @ 05:11) (122/67 - 143/74)  RR: 18 (04-27-20 @ 05:11) (18 - 18)  SpO2: 97% (04-27-20 @ 05:11) (97% - 99%)  Wt(kg): --  I&O's Summary      REVIEW OF SYSTEMS: denies fever, chills, SOB, palpitations, chest pain, abdominal pain, nausea, vomitting, diarrhea, constipation, dizziness    MEDICATIONS  (STANDING):  ascorbic acid 500 milliGRAM(s) Oral daily  busPIRone 10 milliGRAM(s) Oral three times a day  cholecalciferol 1000 Unit(s) Oral daily  enoxaparin Injectable 40 milliGRAM(s) SubCutaneous daily  fluvoxaMINE 25 milliGRAM(s) Oral at bedtime  levothyroxine 25 MICROGram(s) Oral daily  melatonin 3 milliGRAM(s) Oral at bedtime  mirtazapine 45 milliGRAM(s) Oral at bedtime  sodium chloride 0.9%. 1000 milliLiter(s) (75 mL/Hr) IV Continuous <Continuous>  traZODone 50 milliGRAM(s) Oral every 12 hours    MEDICATIONS  (PRN):      PHYSICAL EXAM:  GENERAL: NAD, well-groomed, well-developed  HEAD:  Atraumatic, Normocephalic  EYES: EOMI, PERRLA, conjunctiva and sclera clear  ENMT: No tonsillar erythema, exudates, or enlargement; Moist mucous membranes, Good dentition, No lesions  NECK: Supple, No JVD, Normal thyroid  NERVOUS SYSTEM:  Alert & Oriented X3, Good concentration; Motor Strength 5/5 B/L upper and lower extremities; DTRs 2+ intact and symmetric  CHEST/LUNG: Clear to percussion bilaterally; No rales, rhonchi, wheezing, or rubs  HEART: Regular rate and rhythm; No murmurs, rubs, or gallops  ABDOMEN: Soft, Nontender, Nondistended; Bowel sounds present  EXTREMITIES:  2+ Peripheral Pulses, No clubbing, cyanosis, or edema  LYMPH: No lymphadenopathy noted  SKIN: No rashes or lesions  LABS:                        13.7   5.87  )-----------( 131      ( 27 Apr 2020 07:42 )             40.5     04-27    141  |  107  |  13  ----------------------------<  97  3.3<L>   |  23  |  0.60    Ca    8.4      27 Apr 2020 07:42  Phos  2.2     04-27  Mg     2.4     04-27    TPro  7.2  /  Alb  3.2<L>  /  TBili  0.5  /  DBili  x   /  AST  115<H>  /  ALT  40  /  AlkPhos  86  04-27        CAPILLARY BLOOD GLUCOSE

## 2020-04-27 NOTE — DISCHARGE NOTE PROVIDER - NSDCMRMEDTOKEN_GEN_ALL_CORE_FT
busPIRone 10 mg oral tablet: 1 tab(s) orally 3 times a day  Calcium 600+D 600 mg-200 intl units (5 mcg) oral tablet: 1 tab(s) orally 2 times a day  fluvoxaMINE 25 mg oral tablet: 1 tab(s) orally once a day (at bedtime)  levothyroxine 25 mcg (0.025 mg) oral tablet: 1 tab(s) orally once a day  mirtazapine 45 mg oral tablet: 1 tab(s) orally once a day (at bedtime)  traZODone 50 mg oral tablet: 0.5 tab(s) orally 3 times a day

## 2020-04-27 NOTE — DISCHARGE NOTE PROVIDER - NSDCFUADDINST_GEN_ALL_CORE_FT
CORONAVIRUS INSTRUCTIONS:   Based on your current clinical status and stability, it has been determined that you no longer need hospitalization and can recover while remaining in self-quarantine at home. You should follow the prevention steps below until a healthcare provider or local or state health department says you can return to your normal activities.     1. You should restrict activities outside your home, except for getting medical care.   2. Do not go to work, school, or public areas.   3. Avoid using public transportation, ride-sharing, or taxis.   4. Separate yourself from other people and animals in your home as much as possible.  When you are around other people (e.g., sharing a room or vehicle) you should wear a facemask.  5. Wash your hands often with soap and water for at least 20 seconds, especially after blowing your nose, coughing, or sneezing; going to the bathroom; and before eating or preparing food.  6. Cover your mouth and nose with a tissue when you cough or sneeze. Throw used tissues in a lined trash can. Immediately wash your hands with soap and water for at least 20 seconds  7. High touch surfaces include counters, tabletops, doorknobs, bathroom fixtures, toilets, phones, keyboards, tablets, and bedside tables.  8. Avoid sharing dishes, drinking glasses, cups, eating utensils, towels, or bedding with other people or pets in your home. After using these items, they should be washed thoroughly with soap and water.  You are strongly advised to seek prompt medical attention if your illness worsens or you develop new symptoms like fever or difficulty breathing.

## 2020-04-27 NOTE — DISCHARGE NOTE NURSING/CASE MANAGEMENT/SOCIAL WORK - PATIENT PORTAL LINK FT
You can access the FollowMyHealth Patient Portal offered by [facility] by registering at the following website: [email or web address]. By joining [employer]’s FollowMyHealth portal, you will also be able to view your health information using other applications (apps) compatible with our system.

## 2020-04-27 NOTE — DISCHARGE NOTE PROVIDER - NSDCCPCAREPLAN_GEN_ALL_CORE_FT
PRINCIPAL DISCHARGE DIAGNOSIS  Diagnosis: COVID-19  Assessment and Plan of Treatment: You were admitted for suspected and then later confirmed COVID-19. You were able to tolerate room air and were no longer with fever. Please continue isolation precautions as listed below. Please present to ED if symptoms progress.

## 2020-04-27 NOTE — DISCHARGE NOTE PROVIDER - HOSPITAL COURSE
Patient is a 74 yr old female from [facility] sent in for evaluation for fever and cough. As per NH home pt has been having cough and cxr with left lower lobe infiltrate. Pt agitated in ED, unable to provide any history. Pt found to be covid positive in ED, but cxr negative for any infiltrate. In ED, Pt vitals were    Temp 97    HR 82    BP 93/59    RR 18/97        Pt deemed stable to tolerate RA. Pt stable for DC back to NH facility with continued isolated precautions

## 2020-04-27 NOTE — DISCHARGE NOTE PROVIDER - NSFOLLOWUPCLINICS_GEN_ALL_ED_FT
[facility]  Internal Medicine  [address] [place] [address]  Phone: [phone]  Fax: [phone]  Follow Up Time:

## 2020-04-30 ENCOUNTER — INPATIENT (INPATIENT)
Facility: HOSPITAL | Age: 75
LOS: 8 days | Discharge: HOSPICE | DRG: 177 | End: 2020-05-09
Attending: INTERNAL MEDICINE | Admitting: INTERNAL MEDICINE
Payer: MEDICARE

## 2020-04-30 VITALS
OXYGEN SATURATION: 95 % | WEIGHT: 145.06 LBS | DIASTOLIC BLOOD PRESSURE: 74 MMHG | RESPIRATION RATE: 18 BRPM | SYSTOLIC BLOOD PRESSURE: 128 MMHG | TEMPERATURE: 100 F | HEIGHT: 61 IN | HEART RATE: 101 BPM

## 2020-04-30 DIAGNOSIS — J18.9 PNEUMONIA, UNSPECIFIED ORGANISM: ICD-10-CM

## 2020-04-30 DIAGNOSIS — E03.9 HYPOTHYROIDISM, UNSPECIFIED: ICD-10-CM

## 2020-04-30 DIAGNOSIS — U07.1 COVID-19: ICD-10-CM

## 2020-04-30 DIAGNOSIS — R41.82 ALTERED MENTAL STATUS, UNSPECIFIED: ICD-10-CM

## 2020-04-30 DIAGNOSIS — Z71.89 OTHER SPECIFIED COUNSELING: ICD-10-CM

## 2020-04-30 DIAGNOSIS — E87.0 HYPEROSMOLALITY AND HYPERNATREMIA: ICD-10-CM

## 2020-04-30 DIAGNOSIS — Z29.9 ENCOUNTER FOR PROPHYLACTIC MEASURES, UNSPECIFIED: ICD-10-CM

## 2020-04-30 DIAGNOSIS — F32.9 MAJOR DEPRESSIVE DISORDER, SINGLE EPISODE, UNSPECIFIED: ICD-10-CM

## 2020-04-30 DIAGNOSIS — F03.90 UNSPECIFIED DEMENTIA WITHOUT BEHAVIORAL DISTURBANCE: ICD-10-CM

## 2020-04-30 LAB
ALBUMIN SERPL ELPH-MCNC: 3 G/DL — LOW (ref 3.5–5)
ALP SERPL-CCNC: 84 U/L — SIGNIFICANT CHANGE UP (ref 40–120)
ALT FLD-CCNC: 49 U/L DA — SIGNIFICANT CHANGE UP (ref 10–60)
ANION GAP SERPL CALC-SCNC: 8 MMOL/L — SIGNIFICANT CHANGE UP (ref 5–17)
APPEARANCE UR: ABNORMAL
AST SERPL-CCNC: 87 U/L — HIGH (ref 10–40)
BASOPHILS # BLD AUTO: 0.01 K/UL — SIGNIFICANT CHANGE UP (ref 0–0.2)
BASOPHILS NFR BLD AUTO: 0.1 % — SIGNIFICANT CHANGE UP (ref 0–2)
BILIRUB SERPL-MCNC: 0.8 MG/DL — SIGNIFICANT CHANGE UP (ref 0.2–1.2)
BILIRUB UR-MCNC: NEGATIVE — SIGNIFICANT CHANGE UP
BUN SERPL-MCNC: 26 MG/DL — HIGH (ref 7–18)
CALCIUM SERPL-MCNC: 9.1 MG/DL — SIGNIFICANT CHANGE UP (ref 8.4–10.5)
CHLORIDE SERPL-SCNC: 113 MMOL/L — HIGH (ref 96–108)
CO2 SERPL-SCNC: 30 MMOL/L — SIGNIFICANT CHANGE UP (ref 22–31)
COLOR SPEC: YELLOW — SIGNIFICANT CHANGE UP
CREAT SERPL-MCNC: 0.86 MG/DL — SIGNIFICANT CHANGE UP (ref 0.5–1.3)
D DIMER BLD IA.RAPID-MCNC: 844 NG/ML DDU — HIGH
DIFF PNL FLD: ABNORMAL
EOSINOPHIL # BLD AUTO: 0.13 K/UL — SIGNIFICANT CHANGE UP (ref 0–0.5)
EOSINOPHIL NFR BLD AUTO: 1.4 % — SIGNIFICANT CHANGE UP (ref 0–6)
GLUCOSE SERPL-MCNC: 126 MG/DL — HIGH (ref 70–99)
GLUCOSE UR QL: NEGATIVE — SIGNIFICANT CHANGE UP
HCT VFR BLD CALC: 41 % — SIGNIFICANT CHANGE UP (ref 34.5–45)
HGB BLD-MCNC: 13.4 G/DL — SIGNIFICANT CHANGE UP (ref 11.5–15.5)
IMM GRANULOCYTES NFR BLD AUTO: 0.3 % — SIGNIFICANT CHANGE UP (ref 0–1.5)
KETONES UR-MCNC: NEGATIVE — SIGNIFICANT CHANGE UP
LACTATE SERPL-SCNC: 1.2 MMOL/L — SIGNIFICANT CHANGE UP (ref 0.7–2)
LEUKOCYTE ESTERASE UR-ACNC: NEGATIVE — SIGNIFICANT CHANGE UP
LYMPHOCYTES # BLD AUTO: 0.72 K/UL — LOW (ref 1–3.3)
LYMPHOCYTES # BLD AUTO: 7.6 % — LOW (ref 13–44)
MCHC RBC-ENTMCNC: 29.8 PG — SIGNIFICANT CHANGE UP (ref 27–34)
MCHC RBC-ENTMCNC: 32.7 GM/DL — SIGNIFICANT CHANGE UP (ref 32–36)
MCV RBC AUTO: 91.1 FL — SIGNIFICANT CHANGE UP (ref 80–100)
MONOCYTES # BLD AUTO: 0.98 K/UL — HIGH (ref 0–0.9)
MONOCYTES NFR BLD AUTO: 10.4 % — SIGNIFICANT CHANGE UP (ref 2–14)
NEUTROPHILS # BLD AUTO: 7.55 K/UL — HIGH (ref 1.8–7.4)
NEUTROPHILS NFR BLD AUTO: 80.2 % — HIGH (ref 43–77)
NITRITE UR-MCNC: NEGATIVE — SIGNIFICANT CHANGE UP
NRBC # BLD: 0 /100 WBCS — SIGNIFICANT CHANGE UP (ref 0–0)
PH UR: 5 — SIGNIFICANT CHANGE UP (ref 5–8)
PLATELET # BLD AUTO: 214 K/UL — SIGNIFICANT CHANGE UP (ref 150–400)
POTASSIUM SERPL-MCNC: 4.6 MMOL/L — SIGNIFICANT CHANGE UP (ref 3.5–5.3)
POTASSIUM SERPL-SCNC: 4.6 MMOL/L — SIGNIFICANT CHANGE UP (ref 3.5–5.3)
PROT SERPL-MCNC: 8 G/DL — SIGNIFICANT CHANGE UP (ref 6–8.3)
PROT UR-MCNC: 100
RBC # BLD: 4.5 M/UL — SIGNIFICANT CHANGE UP (ref 3.8–5.2)
RBC # FLD: 11.9 % — SIGNIFICANT CHANGE UP (ref 10.3–14.5)
SARS-COV-2 RNA SPEC QL NAA+PROBE: DETECTED
SODIUM SERPL-SCNC: 151 MMOL/L — HIGH (ref 135–145)
SP GR SPEC: 1.02 — SIGNIFICANT CHANGE UP (ref 1.01–1.02)
UROBILINOGEN FLD QL: 1
WBC # BLD: 9.42 K/UL — SIGNIFICANT CHANGE UP (ref 3.8–10.5)
WBC # FLD AUTO: 9.42 K/UL — SIGNIFICANT CHANGE UP (ref 3.8–10.5)

## 2020-04-30 PROCEDURE — 87635 SARS-COV-2 COVID-19 AMP PRB: CPT

## 2020-04-30 PROCEDURE — 96374 THER/PROPH/DIAG INJ IV PUSH: CPT

## 2020-04-30 PROCEDURE — 81003 URINALYSIS AUTO W/O SCOPE: CPT

## 2020-04-30 PROCEDURE — 99285 EMERGENCY DEPT VISIT HI MDM: CPT | Mod: 25

## 2020-04-30 PROCEDURE — 84443 ASSAY THYROID STIM HORMONE: CPT

## 2020-04-30 PROCEDURE — 80053 COMPREHEN METABOLIC PANEL: CPT

## 2020-04-30 PROCEDURE — 71045 X-RAY EXAM CHEST 1 VIEW: CPT | Mod: 26

## 2020-04-30 PROCEDURE — 84100 ASSAY OF PHOSPHORUS: CPT

## 2020-04-30 PROCEDURE — 36415 COLL VENOUS BLD VENIPUNCTURE: CPT

## 2020-04-30 PROCEDURE — 86140 C-REACTIVE PROTEIN: CPT

## 2020-04-30 PROCEDURE — 84145 PROCALCITONIN (PCT): CPT

## 2020-04-30 PROCEDURE — 93010 ELECTROCARDIOGRAM REPORT: CPT

## 2020-04-30 PROCEDURE — 80061 LIPID PANEL: CPT

## 2020-04-30 PROCEDURE — 70450 CT HEAD/BRAIN W/O DYE: CPT | Mod: 26

## 2020-04-30 PROCEDURE — 99285 EMERGENCY DEPT VISIT HI MDM: CPT | Mod: CS

## 2020-04-30 PROCEDURE — 96372 THER/PROPH/DIAG INJ SC/IM: CPT

## 2020-04-30 PROCEDURE — 71045 X-RAY EXAM CHEST 1 VIEW: CPT

## 2020-04-30 PROCEDURE — 83735 ASSAY OF MAGNESIUM: CPT

## 2020-04-30 PROCEDURE — 93005 ELECTROCARDIOGRAM TRACING: CPT

## 2020-04-30 PROCEDURE — 85379 FIBRIN DEGRADATION QUANT: CPT

## 2020-04-30 PROCEDURE — 85027 COMPLETE CBC AUTOMATED: CPT

## 2020-04-30 PROCEDURE — 82728 ASSAY OF FERRITIN: CPT

## 2020-04-30 PROCEDURE — 86803 HEPATITIS C AB TEST: CPT

## 2020-04-30 PROCEDURE — 87086 URINE CULTURE/COLONY COUNT: CPT

## 2020-04-30 RX ORDER — FLUVOXAMINE MALEATE 25 MG/1
1 TABLET ORAL
Qty: 0 | Refills: 0 | DISCHARGE

## 2020-04-30 RX ORDER — ACETAMINOPHEN 500 MG
650 TABLET ORAL EVERY 6 HOURS
Refills: 0 | Status: DISCONTINUED | OUTPATIENT
Start: 2020-04-30 | End: 2020-05-09

## 2020-04-30 RX ORDER — LEVOTHYROXINE SODIUM 125 MCG
1 TABLET ORAL
Qty: 0 | Refills: 0 | DISCHARGE

## 2020-04-30 RX ORDER — MIRTAZAPINE 45 MG/1
1 TABLET, ORALLY DISINTEGRATING ORAL
Qty: 0 | Refills: 0 | DISCHARGE

## 2020-04-30 RX ORDER — ENOXAPARIN SODIUM 100 MG/ML
40 INJECTION SUBCUTANEOUS DAILY
Refills: 0 | Status: DISCONTINUED | OUTPATIENT
Start: 2020-04-30 | End: 2020-05-09

## 2020-04-30 RX ORDER — FLUVOXAMINE MALEATE 25 MG/1
25 TABLET ORAL AT BEDTIME
Refills: 0 | Status: DISCONTINUED | OUTPATIENT
Start: 2020-04-30 | End: 2020-05-09

## 2020-04-30 RX ORDER — TRAZODONE HCL 50 MG
0.5 TABLET ORAL
Qty: 0 | Refills: 0 | DISCHARGE

## 2020-04-30 RX ORDER — ACETAMINOPHEN 500 MG
1000 TABLET ORAL ONCE
Refills: 0 | Status: COMPLETED | OUTPATIENT
Start: 2020-04-30 | End: 2020-04-30

## 2020-04-30 RX ORDER — SODIUM CHLORIDE 9 MG/ML
1000 INJECTION INTRAMUSCULAR; INTRAVENOUS; SUBCUTANEOUS ONCE
Refills: 0 | Status: COMPLETED | OUTPATIENT
Start: 2020-04-30 | End: 2020-04-30

## 2020-04-30 RX ORDER — TRAZODONE HCL 50 MG
25 TABLET ORAL
Refills: 0 | Status: DISCONTINUED | OUTPATIENT
Start: 2020-04-30 | End: 2020-04-30

## 2020-04-30 RX ORDER — SODIUM CHLORIDE 9 MG/ML
1000 INJECTION, SOLUTION INTRAVENOUS
Refills: 0 | Status: DISCONTINUED | OUTPATIENT
Start: 2020-04-30 | End: 2020-05-01

## 2020-04-30 RX ORDER — MIRTAZAPINE 45 MG/1
45 TABLET, ORALLY DISINTEGRATING ORAL AT BEDTIME
Refills: 0 | Status: DISCONTINUED | OUTPATIENT
Start: 2020-04-30 | End: 2020-05-09

## 2020-04-30 RX ORDER — AZITHROMYCIN 500 MG/1
500 TABLET, FILM COATED ORAL ONCE
Refills: 0 | Status: COMPLETED | OUTPATIENT
Start: 2020-04-30 | End: 2020-04-30

## 2020-04-30 RX ORDER — LEVOTHYROXINE SODIUM 125 MCG
25 TABLET ORAL DAILY
Refills: 0 | Status: DISCONTINUED | OUTPATIENT
Start: 2020-04-30 | End: 2020-05-01

## 2020-04-30 RX ADMIN — SODIUM CHLORIDE 1000 MILLILITER(S): 9 INJECTION INTRAMUSCULAR; INTRAVENOUS; SUBCUTANEOUS at 15:49

## 2020-04-30 RX ADMIN — SODIUM CHLORIDE 60 MILLILITER(S): 9 INJECTION, SOLUTION INTRAVENOUS at 20:00

## 2020-04-30 RX ADMIN — AZITHROMYCIN 500 MILLIGRAM(S): 500 TABLET, FILM COATED ORAL at 16:43

## 2020-04-30 RX ADMIN — AZITHROMYCIN 255 MILLIGRAM(S): 500 TABLET, FILM COATED ORAL at 15:07

## 2020-04-30 RX ADMIN — ENOXAPARIN SODIUM 40 MILLIGRAM(S): 100 INJECTION SUBCUTANEOUS at 23:00

## 2020-04-30 RX ADMIN — Medication 1000 MILLIGRAM(S): at 16:43

## 2020-04-30 RX ADMIN — SODIUM CHLORIDE 1000 MILLILITER(S): 9 INJECTION INTRAMUSCULAR; INTRAVENOUS; SUBCUTANEOUS at 14:39

## 2020-04-30 RX ADMIN — Medication 400 MILLIGRAM(S): at 14:39

## 2020-04-30 NOTE — H&P ADULT - PROBLEM SELECTOR PLAN 4
IMPROVE VTE Individual Risk Assessment  RISK                                                                Points  [  ] Previous VTE                                                  3  [  ] Thrombophilia                                               2  [  ] Lower limb paralysis                                      2        (unable to hold up >15 seconds)    [  ] Current Cancer                                              2         (within 6 months)  [x  ] Immobilization > 24 hrs                                1  [  ] ICU/CCU stay > 24 hours                              1  [x  ] Age > 60                                                      1  IMPROVE VTE Score _________2, lovenox for DVT proph supportive measures on fluvoxamine, buspirone, trazodone, mirtazapine  will hold trazodone due to mental status

## 2020-04-30 NOTE — ED PROVIDER NOTE - CARE PLAN
Principal Discharge DX:	Pneumonia  Secondary Diagnosis:	Altered mental status  Secondary Diagnosis:	UTI (urinary tract infection)

## 2020-04-30 NOTE — ED PROVIDER NOTE - OBJECTIVE STATEMENT
A 75 yo female with PMHx of Dementia, presents BIBA sent in from [facility] for AMS and hypoxia. Patient was discharged from [facility] on 4/27 and was found to be covid positive on 4/24. Today, patient was hypoxic and confused. In the ED, A complete HPI is unable to be obtained secondary to the patient's dementia. Patient is afebrile in the ED with oxygen saturation at 92% on RA and up to 96% on nonrebreather.

## 2020-04-30 NOTE — H&P ADULT - ATTENDING COMMENTS
agree with above    fever , sob, hypoxia  COVID POS   also hypernatremia   O2  d5w iv  poor prognosis

## 2020-04-30 NOTE — H&P ADULT - PROBLEM SELECTOR PLAN 1
sent from NH due to altered mental status  Pt. has dementia  CT head negative for acute pathology  UA negative  Na 151  likely AMS due to COVID  c/w IVF  supportive measures sent from NH due to altered mental status  Pt. has dementia  CT head negative for acute pathology  UA negative  Na 151  likely AMS due to COVID and hypernatremia  c/w IVF  supportive measures sent from NH due to altered mental status  Pt. has dementia  CT head negative for acute pathology  UA negative  Na 151  likely AMS due to COVID and hypernatremia  c/w IVF  NPO  supportive measures  DNR/DNI  Palliative consulted

## 2020-04-30 NOTE — H&P ADULT - PROBLEM SELECTOR PLAN 2
p/w Shortness of breath, cough   - Respiratory status- Not in distress, S02 on NC on my evaluation  - T-   - WBC: WNL, lymphopenia 0.72, transaminitis  - CXR - B/L infiltrates  - Ed course; IV NS 1lt. azithro  - Will hold off antibiotics for now  - Tylenol, Robitussin PRN  - contact and airborne isolation precaution   - FU CRP, ferritin, procalcitonin p/w hypoxia  - Respiratory status- Not in distress, S02 97% on NRB on my evaluation  - T- 101.9  - WBC: WNL, lymphopenia 0.72, transaminitis  - CXR - B/L infiltrates  - Ed course; IV NS 1lt. azithro  - Will hold off antibiotics for now  - Tylenol, Robitussin PRN  - contact and airborne isolation precaution   - FU CRP, ferritin, procalcitonin

## 2020-04-30 NOTE — ED PROVIDER NOTE - PROGRESS NOTE DETAILS
Rei: Signed out by Dr. Costa. CT head unremarkable. Discussed with Dr. Krause who will accept patient for admission. Will cover with broad spectrum abx.

## 2020-04-30 NOTE — ED ADULT NURSE NOTE - OBJECTIVE STATEMENT
pt is here for AMS.  BIBA, sending from nursing home, mastectomy at right side, skin intact,  bruises at upper and lower extremities, non verbal at this time, no distress noted at this time.

## 2020-04-30 NOTE — H&P ADULT - NSHPPHYSICALEXAM_GEN_ALL_CORE
Vital Signs Last 24 Hrs  T(C): 36.8 (30 Apr 2020 17:02), Max: 38.8 (30 Apr 2020 14:19)  T(F): 98.2 (30 Apr 2020 17:02), Max: 101.9 (30 Apr 2020 14:19)  HR: 92 (30 Apr 2020 17:02) (92 - 101)  BP: 150/74 (30 Apr 2020 17:02) (128/74 - 150/74)  BP(mean): --  RR: 18 (30 Apr 2020 17:02) (18 - 18)  SpO2: 97% (30 Apr 2020 17:02) (95% - 97%)

## 2020-04-30 NOTE — ED PROVIDER NOTE - CLINICAL SUMMARY MEDICAL DECISION MAKING FREE TEXT BOX
73 yo female with known covid, presents hypoxic and with AMS. Will check CT head, UA, labs, and will reassess for admission.

## 2020-04-30 NOTE — ED PROVIDER NOTE - UNABLE TO OBTAIN
A complete HPI is unable to be obtained secondary to the patient's dementia Dementia A complete ROS is unable to be obtained secondary to the patient's dementia

## 2020-04-30 NOTE — H&P ADULT - PROBLEM SELECTOR PLAN 5
DNR/DNI IMPROVE VTE Individual Risk Assessment  RISK                                                                Points  [  ] Previous VTE                                                  3  [  ] Thrombophilia                                               2  [  ] Lower limb paralysis                                      2        (unable to hold up >15 seconds)    [  ] Current Cancer                                              2         (within 6 months)  [x  ] Immobilization > 24 hrs                                1  [  ] ICU/CCU stay > 24 hours                              1  [x  ] Age > 60                                                      1  IMPROVE VTE Score _________2, lovenox for DVT proph supportive measures

## 2020-04-30 NOTE — ED ADULT NURSE NOTE - NSIMPLEMENTINTERV_GEN_ALL_ED
Implemented All Fall with Harm Risk Interventions:  [place] to call system. Call bell, personal items and telephone within reach. Instruct patient to call for assistance. Room bathroom lighting operational. Non-slip footwear when patient is off stretcher. Physically safe environment: no spills, clutter or unnecessary equipment. Stretcher in lowest position, wheels locked, appropriate side rails in place. Provide visual cue, wrist band, yellow gown, etc. Monitor gait and stability. Monitor for mental status changes and reorient to person, place, and time. Review medications for side effects contributing to fall risk. Reinforce activity limits and safety measures with patient and family. Provide visual clues: red socks.

## 2020-04-30 NOTE — H&P ADULT - HISTORY OF PRESENT ILLNESS
74F from [facility], [facility] of dementia, hypothyroidism, presented to ED due to AMS and hypoxia. Pt. is non-verbal, unable to obtain history. Pt. was discharged on 27th April after being treated for COVID. As per NH chart, pt. was sent due to altered mental status with hypoxia. Here, pt. was S02 95% on NRB on arrival. 74F from [facility], [facility] of dementia, hypothyroidism, presented to ED due to AMS and hypoxia. Pt. is non-verbal, unable to obtain history. Pt. was discharged on 27th April after being treated for COVID. As per NH chart, pt. was sent due to altered mental status with hypoxia. Here, pt. was S02 95% on NRB on arrival. Spoke to son, who states pt. has been deteriorating and has not been responsive while video chatting for few days and understands the poor prognosis and wants her to be comfortable as possible. 74F from [facility], [facility] of dementia, hypothyroidism, presented to ED due to AMS and hypoxia. Pt. is non-verbal, awake, does not respond to tactile/verbal stimuli, unable to obtain history. Pt. was discharged on 27th April after being treated for COVID. As per NH chart, pt. was sent due to altered mental status with hypoxia. Here, pt. was S02 95% on NRB on arrival. Spoke to son, who states pt. has been deteriorating and has not been responsive while video chatting for few days and understands the poor prognosis and wants her to be comfortable as possible.

## 2020-04-30 NOTE — H&P ADULT - ASSESSMENT
74F from [facility], PMH of dementia, hypothyroidism, presented to ED due to AMS and hypoxia. 74F from [facility], [facility] of dementia, hypothyroidism, presented to ED due to AMS and hypoxia. Pt. is non-verbal, unable to obtain history. Pt. was discharged on 27th April after being treated for COVID. As per NH chart, pt. was sent due to altered mental status with hypoxia. Here, pt. was S02 95% on NRB on arrival. Spoke to son, who states pt. has been deteriorating and has not been responsive while video chatting for few days and understands the poor prognosis and wants her to be comfortable as possible.

## 2020-05-01 LAB
24R-OH-CALCIDIOL SERPL-MCNC: 57.4 NG/ML — SIGNIFICANT CHANGE UP (ref 30–80)
A1C WITH ESTIMATED AVERAGE GLUCOSE RESULT: 5.6 % — SIGNIFICANT CHANGE UP (ref 4–5.6)
ALBUMIN SERPL ELPH-MCNC: 2.7 G/DL — LOW (ref 3.5–5)
ALP SERPL-CCNC: 126 U/L — HIGH (ref 40–120)
ALT FLD-CCNC: 107 U/L DA — HIGH (ref 10–60)
ANION GAP SERPL CALC-SCNC: 10 MMOL/L — SIGNIFICANT CHANGE UP (ref 5–17)
AST SERPL-CCNC: 152 U/L — HIGH (ref 10–40)
BASOPHILS # BLD AUTO: 0 K/UL — SIGNIFICANT CHANGE UP (ref 0–0.2)
BASOPHILS NFR BLD AUTO: 0 % — SIGNIFICANT CHANGE UP (ref 0–2)
BILIRUB SERPL-MCNC: 0.8 MG/DL — SIGNIFICANT CHANGE UP (ref 0.2–1.2)
BUN SERPL-MCNC: 21 MG/DL — HIGH (ref 7–18)
CALCIUM SERPL-MCNC: 8.7 MG/DL — SIGNIFICANT CHANGE UP (ref 8.4–10.5)
CHLORIDE SERPL-SCNC: 115 MMOL/L — HIGH (ref 96–108)
CHOLEST SERPL-MCNC: 138 MG/DL — SIGNIFICANT CHANGE UP (ref 10–199)
CO2 SERPL-SCNC: 26 MMOL/L — SIGNIFICANT CHANGE UP (ref 22–31)
CREAT SERPL-MCNC: 0.64 MG/DL — SIGNIFICANT CHANGE UP (ref 0.5–1.3)
CRP SERPL-MCNC: 5.91 MG/DL — HIGH (ref 0–0.4)
CULTURE RESULTS: NO GROWTH — SIGNIFICANT CHANGE UP
EOSINOPHIL # BLD AUTO: 0 K/UL — SIGNIFICANT CHANGE UP (ref 0–0.5)
EOSINOPHIL NFR BLD AUTO: 0 % — SIGNIFICANT CHANGE UP (ref 0–6)
ESTIMATED AVERAGE GLUCOSE: 114 MG/DL — SIGNIFICANT CHANGE UP (ref 68–114)
FERRITIN SERPL-MCNC: 1020 NG/ML — HIGH (ref 15–150)
GLUCOSE SERPL-MCNC: 121 MG/DL — HIGH (ref 70–99)
HCT VFR BLD CALC: 38.8 % — SIGNIFICANT CHANGE UP (ref 34.5–45)
HDLC SERPL-MCNC: 43 MG/DL — LOW
HGB BLD-MCNC: 12.7 G/DL — SIGNIFICANT CHANGE UP (ref 11.5–15.5)
IMM GRANULOCYTES NFR BLD AUTO: 0.5 % — SIGNIFICANT CHANGE UP (ref 0–1.5)
LIPID PNL WITH DIRECT LDL SERPL: 72 MG/DL — SIGNIFICANT CHANGE UP
LYMPHOCYTES # BLD AUTO: 0.59 K/UL — LOW (ref 1–3.3)
LYMPHOCYTES # BLD AUTO: 6.7 % — LOW (ref 13–44)
MAGNESIUM SERPL-MCNC: 2.3 MG/DL — SIGNIFICANT CHANGE UP (ref 1.6–2.6)
MCHC RBC-ENTMCNC: 30.1 PG — SIGNIFICANT CHANGE UP (ref 27–34)
MCHC RBC-ENTMCNC: 32.7 GM/DL — SIGNIFICANT CHANGE UP (ref 32–36)
MCV RBC AUTO: 91.9 FL — SIGNIFICANT CHANGE UP (ref 80–100)
MONOCYTES # BLD AUTO: 0.66 K/UL — SIGNIFICANT CHANGE UP (ref 0–0.9)
MONOCYTES NFR BLD AUTO: 7.5 % — SIGNIFICANT CHANGE UP (ref 2–14)
NEUTROPHILS # BLD AUTO: 7.54 K/UL — HIGH (ref 1.8–7.4)
NEUTROPHILS NFR BLD AUTO: 85.3 % — HIGH (ref 43–77)
NRBC # BLD: 0 /100 WBCS — SIGNIFICANT CHANGE UP (ref 0–0)
PHOSPHATE SERPL-MCNC: 1.6 MG/DL — LOW (ref 2.5–4.5)
PLATELET # BLD AUTO: 194 K/UL — SIGNIFICANT CHANGE UP (ref 150–400)
POTASSIUM SERPL-MCNC: 3.2 MMOL/L — LOW (ref 3.5–5.3)
POTASSIUM SERPL-SCNC: 3.2 MMOL/L — LOW (ref 3.5–5.3)
PROCALCITONIN SERPL-MCNC: 0.14 NG/ML — HIGH (ref 0.02–0.1)
PROT SERPL-MCNC: 7.1 G/DL — SIGNIFICANT CHANGE UP (ref 6–8.3)
RBC # BLD: 4.22 M/UL — SIGNIFICANT CHANGE UP (ref 3.8–5.2)
RBC # FLD: 11.8 % — SIGNIFICANT CHANGE UP (ref 10.3–14.5)
SODIUM SERPL-SCNC: 151 MMOL/L — HIGH (ref 135–145)
SPECIMEN SOURCE: SIGNIFICANT CHANGE UP
TOTAL CHOLESTEROL/HDL RATIO MEASUREMENT: 3.2 RATIO — LOW (ref 3.3–7.1)
TRIGL SERPL-MCNC: 115 MG/DL — SIGNIFICANT CHANGE UP (ref 10–149)
TSH SERPL-MCNC: 0.48 UU/ML — SIGNIFICANT CHANGE UP (ref 0.34–4.82)
VIT B12 SERPL-MCNC: >2000 PG/ML — HIGH (ref 232–1245)
WBC # BLD: 8.83 K/UL — SIGNIFICANT CHANGE UP (ref 3.8–10.5)
WBC # FLD AUTO: 8.83 K/UL — SIGNIFICANT CHANGE UP (ref 3.8–10.5)

## 2020-05-01 RX ORDER — POTASSIUM PHOSPHATE, MONOBASIC POTASSIUM PHOSPHATE, DIBASIC 236; 224 MG/ML; MG/ML
15 INJECTION, SOLUTION INTRAVENOUS ONCE
Refills: 0 | Status: COMPLETED | OUTPATIENT
Start: 2020-05-01 | End: 2020-05-01

## 2020-05-01 RX ORDER — POTASSIUM CHLORIDE 20 MEQ
10 PACKET (EA) ORAL
Refills: 0 | Status: DISCONTINUED | OUTPATIENT
Start: 2020-05-01 | End: 2020-05-02

## 2020-05-01 RX ORDER — LEVOTHYROXINE SODIUM 125 MCG
12.5 TABLET ORAL
Refills: 0 | Status: DISCONTINUED | OUTPATIENT
Start: 2020-05-01 | End: 2020-05-04

## 2020-05-01 RX ORDER — SODIUM CHLORIDE 9 MG/ML
1000 INJECTION, SOLUTION INTRAVENOUS
Refills: 0 | Status: DISCONTINUED | OUTPATIENT
Start: 2020-05-01 | End: 2020-05-02

## 2020-05-01 RX ADMIN — ENOXAPARIN SODIUM 40 MILLIGRAM(S): 100 INJECTION SUBCUTANEOUS at 20:48

## 2020-05-01 RX ADMIN — Medication 10 MILLIGRAM(S): at 15:30

## 2020-05-01 RX ADMIN — Medication 10 MILLIGRAM(S): at 20:48

## 2020-05-01 RX ADMIN — POTASSIUM PHOSPHATE, MONOBASIC POTASSIUM PHOSPHATE, DIBASIC 62.5 MILLIMOLE(S): 236; 224 INJECTION, SOLUTION INTRAVENOUS at 14:27

## 2020-05-01 RX ADMIN — Medication 100 MILLIEQUIVALENT(S): at 11:27

## 2020-05-01 RX ADMIN — MIRTAZAPINE 45 MILLIGRAM(S): 45 TABLET, ORALLY DISINTEGRATING ORAL at 20:48

## 2020-05-01 RX ADMIN — Medication 100 MILLIEQUIVALENT(S): at 18:14

## 2020-05-01 RX ADMIN — Medication 12.5 MICROGRAM(S): at 05:16

## 2020-05-01 RX ADMIN — Medication 1 TABLET(S): at 14:27

## 2020-05-01 RX ADMIN — FLUVOXAMINE MALEATE 25 MILLIGRAM(S): 25 TABLET ORAL at 20:48

## 2020-05-01 NOTE — GOALS OF CARE CONVERSATION - ADVANCED CARE PLANNING - CONVERSATION DETAILS
PC NP and PC  contacted patient's son Varinder Cole to provide a clinical summary and emotional support.  Mr. Cole provided an illness review stating his mother was a resident at [facility], had a recent hospitalization and was transitioned to [facility] for KEVIN.  As per Mr. Cole, the patient was conversing,  ambulating and "present"prior to the most recent admission to [facility].  Patient has dementia and Covid 19 and is currently non verbal and not responding to tactile stimuli as per medical team.  PAtient's son expressed shock regarding the patient's rapid decline.  He confirmed the code status which had already been addressed is DNR/DNI.  Mr. Cole stated he would like to continue the current medical management at this time and "give the patient the opportunity to improve knowing her mental status may worsen."  Patient is single, [employer], a retired  and described as an amazing person by her son.  Mr. Cole stated his mothers' philosophy in work and life was The Starfish Theory:" it is worth it to make effort even if you help one person at a time."  Patient has one son who stated he is  and has support.  Emotional support was provided as well as education regarding hospice in a SNF and the need to apply for  long term medicaid. PC NP and PC  contacted patient's son Varinder Cole to provide a clinical summary and emotional support.  Mr. Cole provided an illness review stating his mother was a resident at [facility], had a recent hospitalization and was transitioned to [facility] for KEVIN.  As per Mr. Cole, the patient was conversing,  ambulating and "present"prior to the most recent admission to [facility].  Patient has dementia and Covid 19 and is currently non verbal and not responding to tactile stimuli as per medical team.  PAtient's son expressed shock regarding the patient's rapid decline.  He confirmed the code status which had already been addressed is DNR/DNI.  Mr. Cole stated he would like to continue the current medical management at this time and "give the patient the opportunity to improve knowing her mental status may worsen."  Patient is single, Pent[employer], a retired  and described as an amazing person by her son.  Mr. Cole stated his mothers' philosophy in work and life was The Starfish Theory:" it is worth it to make effort even if you help one person at a time."  Patient has one son who stated he is  and has support.  Emotional support was provided as well as education regarding hospice in a SNF and the need to apply for  long term medicaid.  JACK Ramos contacted Natacha [facility]iles [facility] who confirmed they are contracted with [facility]  Natacha stated the patient's son will need to f/u with their finance office to do an intake if the patient can not return for KEVIN and needs residential care with hospice requiring long term medicaid application.  JACK Ramos contacted the patient's son and Medicine  to update them regarding this discussion with [facility].

## 2020-05-01 NOTE — SWALLOW BEDSIDE ASSESSMENT ADULT - ADDITIONAL RECOMMENDATIONS
+ May consider alternative means of nutrition/hydration if in alignment with pt's/HCP's goals of care.   + Consider Palliative Care consult to determine the GOC with re: to provision of nutrition in this patient.

## 2020-05-01 NOTE — PROGRESS NOTE ADULT - SUBJECTIVE AND OBJECTIVE BOX
HPI:  74F from [facility], [facility] of dementia, hypothyroidism, presented to ED due to AMS and hypoxia. Pt. is non-verbal, awake, does not respond to tactile/verbal stimuli, unable to obtain history. Pt. was discharged on  after being treated for COVID. As per NH chart, pt. was sent due to altered mental status with hypoxia. Here, pt. was S02 95% on NRB on arrival. Spoke to son, who states pt. has been deteriorating and has not been responsive while video chatting for few days and understands the poor prognosis and wants her to be comfortable as possible. (2020 17:06)      Patient is a 74y old  Female who presents with a chief complaint of AMS and hypoxia (2020 17:06)      INTERVAL HPI/OVERNIGHT EVENTS:  T(C): 36.8 (20 @ 07:58), Max: 36.9 (20 @ 20:01)  HR: 88 (20 @ 07:58) (88 - 93)  BP: 145/80 (20 @ 07:58) (144/73 - 150/74)  RR: 19 (20 @ 07:58) (18 - 22)  SpO2: 99% (20 @ 07:58) (97% - 99%)  Wt(kg): --  I&O's Summary      REVIEW OF SYSTEMS: denies fever, chills, SOB, palpitations, chest pain, abdominal pain, nausea, vomitting, diarrhea, constipation, dizziness    MEDICATIONS  (STANDING):  busPIRone 10 milliGRAM(s) Oral three times a day  calcium carbonate 1250 mG  + Vitamin D (OsCal 500 + D) 1 Tablet(s) Oral daily  dextrose 5%. 1000 milliLiter(s) (60 mL/Hr) IV Continuous <Continuous>  enoxaparin Injectable 40 milliGRAM(s) SubCutaneous daily  fluvoxaMINE 25 milliGRAM(s) Oral at bedtime  levothyroxine Injectable 12.5 MICROGram(s) IV Push <User Schedule>  mirtazapine 45 milliGRAM(s) Oral at bedtime  potassium chloride  10 mEq/100 mL IVPB 10 milliEquivalent(s) IV Intermittent every 1 hour  potassium phosphate IVPB 15 milliMole(s) IV Intermittent once    MEDICATIONS  (PRN):  acetaminophen   Tablet .. 650 milliGRAM(s) Oral every 6 hours PRN Temp greater or equal to 38C (100.4F), Mild Pain (1 - 3)  guaiFENesin   Syrup  (Sugar-Free) 100 milliGRAM(s) Oral every 6 hours PRN Cough      PHYSICAL EXAM:  GENERAL: NAD, well-groomed, well-developed  HEAD:  Atraumatic, Normocephalic  EYES: EOMI, PERRLA, conjunctiva and sclera clear  ENMT: No tonsillar erythema, exudates, or enlargement; Moist mucous membranes, Good dentition, No lesions  NECK: Supple, No JVD, Normal thyroid  NERVOUS SYSTEM:  Alert & Oriented X3, Good concentration; Motor Strength 5/5 B/L upper and lower extremities; DTRs 2+ intact and symmetric  CHEST/LUNG: Clear to percussion bilaterally; No rales, rhonchi, wheezing, or rubs  HEART: Regular rate and rhythm; No murmurs, rubs, or gallops  ABDOMEN: Soft, Nontender, Nondistended; Bowel sounds present  EXTREMITIES:  2+ Peripheral Pulses, No clubbing, cyanosis, or edema  LYMPH: No lymphadenopathy noted  SKIN: No rashes or lesions  LABS:                        12.7   8.83  )-----------( 194      ( 01 May 2020 06:33 )             38.8     05-    151<H>  |  115<H>  |  21<H>  ----------------------------<  121<H>  3.2<L>   |  26  |  0.64    Ca    8.7      01 May 2020 06:33  Phos  1.6     05-  Mg     2.3     05-    TPro  7.1  /  Alb  2.7<L>  /  TBili  0.8  /  DBili  x   /  AST  152<H>  /  ALT  107<H>  /  AlkPhos  126<H>  05-      Urinalysis Basic - ( 2020 14:29 )    Color: Yellow / Appearance: Slightly Turbid / S.020 / pH: x  Gluc: x / Ketone: Negative  / Bili: Negative / Urobili: 1   Blood: x / Protein: 100 / Nitrite: Negative   Leuk Esterase: Negative / RBC: 0-2 /HPF / WBC 3-5 /HPF   Sq Epi: x / Non Sq Epi: Few /HPF / Bacteria: Moderate /HPF      CAPILLARY BLOOD GLUCOSE            Urinalysis Basic - ( 2020 14:29 )    Color: Yellow / Appearance: Slightly Turbid / S.020 / pH: x  Gluc: x / Ketone: Negative  / Bili: Negative / Urobili: 1   Blood: x / Protein: 100 / Nitrite: Negative   Leuk Esterase: Negative / RBC: 0-2 /HPF / WBC 3-5 /HPF   Sq Epi: x / Non Sq Epi: Few /HPF / Bacteria: Moderate /HPF

## 2020-05-01 NOTE — GOALS OF CARE CONVERSATION - ADVANCED CARE PLANNING - NS PRO AD PATIENT TYPE
Do Not Resuscitate (DNR)/Medical Orders for Life-Sustaining Treatment (MOLST)/DNR/DNI on file Medical Orders for Life-Sustaining Treatment (MOLST)/Do Not Resuscitate (DNR)/Health Care Proxy (HCP)

## 2020-05-01 NOTE — SWALLOW BEDSIDE ASSESSMENT ADULT - PHARYNGEAL PHASE
Delayed pharyngeal swallow/Decreased laryngeal elevation/Multiple swallows Throat clear post oral intake/Cough post oral intake/Delayed pharyngeal swallow/Decreased laryngeal elevation

## 2020-05-01 NOTE — CONSULT NOTE ADULT - SUBJECTIVE AND OBJECTIVE BOX
Time of visit: This is a second consult being written.. noted from 5/1 not saved due to computer malfunction.     CHIEF COMPLAINT: Patient is a 74y old  Female who presents with a chief complaint of AMS and hypoxia (02 May 2020 15:23)      HPI:  74F from [facility], [facility] of dementia, hypothyroidism, presented to ED due to AMS and hypoxia. Pt. is non-verbal, awake, does not respond to tactile/verbal stimuli, unable to obtain history. Pt. was discharged on 27th April after being treated for COVID. As per NH chart, pt. was sent due to altered mental status with hypoxia. Here, pt. was S02 95% on NRB on arrival. Spoke to son, who states pt. has been deteriorating and has not been responsive while video chatting for few days and understands the poor prognosis and wants her to be comfortable as possible. (30 Apr 2020 17:06)   Patient seen and examined.     PAST MEDICAL & SURGICAL HISTORY:  Dementia  No significant past surgical history      Allergies    penicillin (Other)    Intolerances        MEDICATIONS  (STANDING):  busPIRone 10 milliGRAM(s) Oral three times a day  calcium carbonate 1250 mG  + Vitamin D (OsCal 500 + D) 1 Tablet(s) Oral daily  dextrose 5%. 1000 milliLiter(s) (60 mL/Hr) IV Continuous <Continuous>  enoxaparin Injectable 40 milliGRAM(s) SubCutaneous daily  fluvoxaMINE 25 milliGRAM(s) Oral at bedtime  levothyroxine Injectable 12.5 MICROGram(s) IV Push <User Schedule>  mirtazapine 45 milliGRAM(s) Oral at bedtime  potassium chloride    Tablet ER 40 milliEquivalent(s) Oral once      MEDICATIONS  (PRN):  acetaminophen   Tablet .. 650 milliGRAM(s) Oral every 6 hours PRN Temp greater or equal to 38C (100.4F), Mild Pain (1 - 3)  guaiFENesin   Syrup  (Sugar-Free) 100 milliGRAM(s) Oral every 6 hours PRN Cough   Medications up to date at time of exam.    Medications up to date at time of exam.    FAMILY HISTORY:      SOCIAL HISTORY unable to obtain   Smoking History: [   ] smoking/smoke exposure, [   ] former smoker  Living Condition: [   ] apartment, [   ] private house  Work History:   Travel History: denies recent travel  Illicit Substance Use: denies  Alcohol Use: denies    REVIEW OF SYSTEMS: as per EMR    CONSTITUTIONAL:  denies fevers, chills, sweats, weight loss    HEENT:  denies diplopia or blurred vision, sore throat or runny nose.    CARDIOVASCULAR:  denies pressure, squeezing, tightness, or heaviness about the chest; no palpitations.    RESPIRATORY:  denies SOB, cough, PHAM, wheezing.    GASTROINTESTINAL:  denies abdominal pain, nausea, vomiting or diarrhea.    GENITOURINARY: denies dysuria, frequency or urgency.    NEUROLOGIC:  denies numbness, tingling, seizures or weakness.    PSYCHIATRIC:  denies disorder of thought or mood.    MSK: denies swelling, redness      PHYSICAL EXAMINATION:    GENERAL: The patient is a well-developed, well-nourished, in no apparent distress.     Vital Signs Last 24 Hrs  T(C): 36.6 (02 May 2020 15:51), Max: 36.8 (02 May 2020 07:50)  T(F): 97.8 (02 May 2020 15:51), Max: 98.2 (02 May 2020 07:50)  HR: 95 (02 May 2020 15:51) (95 - 96)  BP: 142/78 (02 May 2020 15:51) (142/78 - 159/90)  BP(mean): --  RR: 22 (02 May 2020 15:51) (20 - 22)  SpO2: 100% (02 May 2020 15:51) (96% - 100%)   (if applicable)    Chest Tube (if applicable)    HEENT: Head is normocephalic and atraumatic. Extraocular muscles are intact. Mucous membranes are moist.     NECK: Supple, no palpable adenopathy.    LUNGS: Clear to auscultation, no wheezing, rales, or rhonchi.    HEART: Regular rate and rhythm without murmur.    ABDOMEN: Soft, nontender, and nondistended.  No hepatosplenomegaly is noted.    RENAL: No difficulty voiding, no pelvic pain    EXTREMITIES: Without any cyanosis, clubbing, rash, lesions or edema.    NEUROLOGIC: Awake, + confused     SKIN: Warm, dry, good turgor.      LABS:                        12.7   10.11 )-----------( 209      ( 02 May 2020 10:57 )             38.6     05-02    150<H>  |  116<H>  |  19<H>  ----------------------------<  98  3.5   |  27  |  0.59    Ca    9.1      02 May 2020 10:57  Phos  1.6     05-01  Mg     2.3     05-01    TPro  7.1  /  Alb  2.5<L>  /  TBili  0.6  /  DBili  x   /  AST  51<H>  /  ALT  62<H>  /  AlkPhos  113  05-02                    Procalcitonin, Serum: 0.14 ng/mL (05-01-20 @ 04:05)      MICROBIOLOGY: (if applicable)    COVID-19:COVID-19 PCR . (04.30.20 @ 14:25)    COVID-19 PCR: Detected:     RADIOLOGY & ADDITIONAL STUDIES:  EKG:   CXR:  < from: Xray Chest 1 View-PORTABLE IMMEDIATE (04.30.20 @ 14:42) >    EXAM:  XR CHEST PORTABLE IMMED 1V                            PROCEDURE DATE:  04/30/2020          INTERPRETATION:    Examination: XR CHEST IMMEDIATE    History: , Shortness of Breath, Cough,  Fever  Comparison 4/24/2020  Findings:  No change heart mediastinum. Patchy right perihilar and bibasilar airspace infiltrates consistent with multifocal pneumonia. Viral/atypical etiologies considered including potential Covid 19. No pleural effusion. Clips right axilla..    Impression:  1.  Bilateral pneumonia as described.      DISCRETE X-RAY DATA:  Percent of LEFT lung opacification: 34-66%  Percent of RIGHT lung opacification: 34-66%  Change in lung opacification from most recent x-ray (<=3 days): Increase  Change from prior dated 3 or more days (same admission): No Prior                  MELONIE DORMAN M.D., ATTENDING RADIOLOGIST  This document has been electronically signed. Apr 30 2020  2:40PM            CT head:< from: CT Head No Cont (04.30.20 @ 15:59) >    EXAM:  CT BRAIN                            PROCEDURE DATE:  04/30/2020          INTERPRETATION:  Exam Date: 4/30/2020 3:59 PM    CT head without IV contrast    CLINICAL INFORMATION:  altered mental status    TECHNIQUE: Contiguous axial sections were obtained through the head.   This scan was performed using automatic exposure control (radiation dose reduction software) to obtain a diagnostic image quality scan with patient dose as low as reasonably achievable.      COMPARISON: No previous examinations are available for review.     FINDINGS:       There is no evidence of intraparenchymal or extraaxial hemorrhage.   There is no CT evidence of large vessel acute infarct. No mass effect is found in the brain.  No evidence of midline shift or herniation pattern.    The ventricles, sulci and basal cisterns appear unremarkable.    Visualized paranasal sinuses are clear.      IMPRESSION:       No acute intracranial findings.                MALIKA SANCHEZ M.D., ATTENDING RADIOLOGIST  This document has been electronically signed. Apr 30 2020  4:05PM                < end of copied text >    ECHO:    IMPRESSION: 74y Female PAST MEDICAL & SURGICAL HISTORY:  Dementia  No significant past surgical history   p/w           IMP: This is a 74 yr old woman  from [facility], [facility] of dementia, hypothyroidism, presented to ED due to AMS and hypoxia. Pt. is non-verbal, awake, does not respond to tactile/verbal stimuli, unable to obtain history. Pt. was discharged on 27th April after being treated for COVID. As per NH chart, pt. was sent due to altered mental status with hypoxia. Here, pt. was S02 95% on NRB on arrival. Spoke to son, who states pt. has been deteriorating and has not been responsive while video chatting for few days and understands the poor prognosis and wants her to be comfortable as possible.     Pat with acute hypoxic resp failure due to b/l pna secondary to covid-19 infection. AMS/ encephalopathy due to hypoxia in combination with hypernatremia  CT head is neg for acute processes.  CXR shows hyper-inflation possible due to smoke esposure.     -acute hypoxic resp failure  -b/l pna  -covid-19 infection  -AMS/Encephalopathy  -hypothyroid   -hyper-natremia      Plan:  -continue O2 supp  -correct hypernatremia  -isolation; contact and airborne  -monitor Na  -asp precaution  -agreed with DNR/DNI  -no steroids for now  -albuter 2 puff q6h   -dvt/gi prophy    case discussed with Dr christie

## 2020-05-01 NOTE — SWALLOW BEDSIDE ASSESSMENT ADULT - SLP REFERRING PHYSICIAN
74F from [facility], [facility] of dementia, hypothyroidism, presented to ED due to AMS and hypoxia. Pt. is non-verbal, awake, does not respond to tactile/verbal stimuli, unable to obtain history. Pt. was discharged on 27th April after being treated for COVID. As per NH chart, pt. was sent due to altered mental status with hypoxia

## 2020-05-01 NOTE — CHART NOTE - NSCHARTNOTEFT_GEN_A_CORE
Palliative Care:    73 y/o woman from [facility], PMH of dementia, hypothyroidism, presented to ED due to AMS and hypoxia. Pt. is non-verbal, awake, does not respond to tactile/verbal stimuli, unable to obtain history. Was recently treated and discharged for COVID 19.    CXR shows Patchy right perihilar and bibasilar airspace infiltrates consistent with multifocal pneumonia. Satting 99% on NRB. DNR/DNI on file.  Spoke with the patient's son Varinedr Cole via phone ([phone]) discussed her clinical history, current clinical status and hospice.  Explained hospice philosophy and locations of care. Reviewed all aspects of MOL[facility].  Mr. Cole, acknowledges understanding, he did share that prior to admission in April with COVID 19 the patient was very present with early onset dementia.  He stated understanding of poor prognosis, however,  would like to continue conservative medical management with the hope of meaningful recovery.   He is open to comfort measures should her clinical status continues to decline.  Palliative care will follow.

## 2020-05-01 NOTE — PROGRESS NOTE ADULT - ASSESSMENT
seen and examined vsstable afebrile  confused lithargic but not in any distress  stable   PO ok   labs noted high Na   covid pneumonia  O2   cont same meds

## 2020-05-01 NOTE — SWALLOW BEDSIDE ASSESSMENT ADULT - COMMENTS
Pt seen for bedside swallow evaluation, awake, verbal, but not responsive to queries, confused. HOB elevated to 90°

## 2020-05-01 NOTE — GOALS OF CARE CONVERSATION - ADVANCED CARE PLANNING - TREATMENT GUIDELINE COMMENT
Continue current medical management.  Code status DNR/DNI Continue current medical management.  Code status DNR/DNI.  No central line or vasopressors.

## 2020-05-01 NOTE — SWALLOW BEDSIDE ASSESSMENT ADULT - SWALLOW EVAL: DIAGNOSIS
Pt p/w s&s oropharyngeal dysphagia, c/b weak labial seal, impaired bolus formation, slow A-P transport, oral stasis, suspected premature spillage of bolus to the pharynx, delayed swallow reflex, reduced hyolaryngeal elevation, and overt s&s of penetration/aspiration seen at this exam.

## 2020-05-01 NOTE — SWALLOW BEDSIDE ASSESSMENT ADULT - ORAL PHASE
Decreased anterior-posterior movement of the bolus/Delayed oral transit time Delayed oral transit time/Decreased anterior-posterior movement of the bolus/Stasis in anterior sulcus

## 2020-05-01 NOTE — SWALLOW BEDSIDE ASSESSMENT ADULT - SWALLOW EVAL: RECOMMENDED FEEDING/EATING TECHNIQUES
position upright (90 degrees)/alternate food with liquid/oral hygiene/allow for swallow between intakes/check mouth frequently for oral residue/pocketing/crush medication (when feasible)

## 2020-05-01 NOTE — CHART NOTE - NSCHARTNOTEFT_GEN_A_CORE
Spoke with HCP, (son) Varinder Cole and updated him on his mothers status.  Patient spoke with team this morning and a MOL[facility] form was filed to make the patient DNR/DNI.  After discussion with the HCP, he has since decided to rescind the DNI and keept the patient DNR with limited medical treatment, trial intubation & ventilation,    New MOL[facility] form completed and placed in chart.

## 2020-05-01 NOTE — GOALS OF CARE CONVERSATION - ADVANCED CARE PLANNING - WHAT MATTERS MOST
Patient's son expressed hope that his mother may recover from Covid 19 verbalizing understanding that her mental status maybe more altered than before. Patient's son expressed hope that his mother may recover from Covid 19 verbalizing understanding that her mental status may worsen.

## 2020-05-02 LAB
ALBUMIN SERPL ELPH-MCNC: 2.5 G/DL — LOW (ref 3.5–5)
ALP SERPL-CCNC: 113 U/L — SIGNIFICANT CHANGE UP (ref 40–120)
ALT FLD-CCNC: 62 U/L DA — HIGH (ref 10–60)
ANION GAP SERPL CALC-SCNC: 7 MMOL/L — SIGNIFICANT CHANGE UP (ref 5–17)
AST SERPL-CCNC: 51 U/L — HIGH (ref 10–40)
BILIRUB SERPL-MCNC: 0.6 MG/DL — SIGNIFICANT CHANGE UP (ref 0.2–1.2)
BUN SERPL-MCNC: 19 MG/DL — HIGH (ref 7–18)
CALCIUM SERPL-MCNC: 9.1 MG/DL — SIGNIFICANT CHANGE UP (ref 8.4–10.5)
CHLORIDE SERPL-SCNC: 116 MMOL/L — HIGH (ref 96–108)
CO2 SERPL-SCNC: 27 MMOL/L — SIGNIFICANT CHANGE UP (ref 22–31)
CREAT SERPL-MCNC: 0.59 MG/DL — SIGNIFICANT CHANGE UP (ref 0.5–1.3)
GLUCOSE SERPL-MCNC: 98 MG/DL — SIGNIFICANT CHANGE UP (ref 70–99)
HCT VFR BLD CALC: 38.6 % — SIGNIFICANT CHANGE UP (ref 34.5–45)
HGB BLD-MCNC: 12.7 G/DL — SIGNIFICANT CHANGE UP (ref 11.5–15.5)
MCHC RBC-ENTMCNC: 30 PG — SIGNIFICANT CHANGE UP (ref 27–34)
MCHC RBC-ENTMCNC: 32.9 GM/DL — SIGNIFICANT CHANGE UP (ref 32–36)
MCV RBC AUTO: 91.3 FL — SIGNIFICANT CHANGE UP (ref 80–100)
NRBC # BLD: 0 /100 WBCS — SIGNIFICANT CHANGE UP (ref 0–0)
PLATELET # BLD AUTO: 209 K/UL — SIGNIFICANT CHANGE UP (ref 150–400)
POTASSIUM SERPL-MCNC: 3.5 MMOL/L — SIGNIFICANT CHANGE UP (ref 3.5–5.3)
POTASSIUM SERPL-SCNC: 3.5 MMOL/L — SIGNIFICANT CHANGE UP (ref 3.5–5.3)
PROT SERPL-MCNC: 7.1 G/DL — SIGNIFICANT CHANGE UP (ref 6–8.3)
RBC # BLD: 4.23 M/UL — SIGNIFICANT CHANGE UP (ref 3.8–5.2)
RBC # FLD: 11.6 % — SIGNIFICANT CHANGE UP (ref 10.3–14.5)
SODIUM SERPL-SCNC: 150 MMOL/L — HIGH (ref 135–145)
WBC # BLD: 10.11 K/UL — SIGNIFICANT CHANGE UP (ref 3.8–10.5)
WBC # FLD AUTO: 10.11 K/UL — SIGNIFICANT CHANGE UP (ref 3.8–10.5)

## 2020-05-02 RX ORDER — POTASSIUM CHLORIDE 20 MEQ
40 PACKET (EA) ORAL ONCE
Refills: 0 | Status: COMPLETED | OUTPATIENT
Start: 2020-05-02 | End: 2020-05-02

## 2020-05-02 RX ORDER — SODIUM CHLORIDE 9 MG/ML
1000 INJECTION, SOLUTION INTRAVENOUS
Refills: 0 | Status: DISCONTINUED | OUTPATIENT
Start: 2020-05-02 | End: 2020-05-07

## 2020-05-02 RX ADMIN — Medication 12.5 MICROGRAM(S): at 05:12

## 2020-05-02 RX ADMIN — SODIUM CHLORIDE 60 MILLILITER(S): 9 INJECTION, SOLUTION INTRAVENOUS at 21:16

## 2020-05-02 RX ADMIN — Medication 40 MILLIEQUIVALENT(S): at 17:34

## 2020-05-02 RX ADMIN — Medication 10 MILLIGRAM(S): at 12:24

## 2020-05-02 RX ADMIN — FLUVOXAMINE MALEATE 25 MILLIGRAM(S): 25 TABLET ORAL at 21:13

## 2020-05-02 RX ADMIN — Medication 10 MILLIGRAM(S): at 21:13

## 2020-05-02 RX ADMIN — MIRTAZAPINE 45 MILLIGRAM(S): 45 TABLET, ORALLY DISINTEGRATING ORAL at 21:13

## 2020-05-02 RX ADMIN — Medication 10 MILLIGRAM(S): at 05:12

## 2020-05-02 RX ADMIN — Medication 1 TABLET(S): at 12:24

## 2020-05-02 RX ADMIN — ENOXAPARIN SODIUM 40 MILLIGRAM(S): 100 INJECTION SUBCUTANEOUS at 21:13

## 2020-05-02 NOTE — PROGRESS NOTE ADULT - ASSESSMENT
rupert nd examined vsstable afebrile physical unchanged  comfortable on bed awake but confused   on 2 lnc  98 perc sat   labs noted  hgb 12.7  Na 150 k 3.5  cont observation pulm    d5w    speech and swallow

## 2020-05-02 NOTE — PROGRESS NOTE ADULT - SUBJECTIVE AND OBJECTIVE BOX
HPI:  74F from [facility], [facility] of dementia, hypothyroidism, presented to ED due to AMS and hypoxia. Pt. is non-verbal, awake, does not respond to tactile/verbal stimuli, unable to obtain history. Pt. was discharged on 27th April after being treated for COVID. As per NH chart, pt. was sent due to altered mental status with hypoxia. Here, pt. was S02 95% on NRB on arrival. Spoke to son, who states pt. has been deteriorating and has not been responsive while video chatting for few days and understands the poor prognosis and wants her to be comfortable as possible. (30 Apr 2020 17:06)      Patient is a 74y old  Female who presents with a chief complaint of AMS and hypoxia (01 May 2020 14:25)      INTERVAL HPI/OVERNIGHT EVENTS:  T(C): 36.8 (05-02-20 @ 07:50), Max: 37.1 (05-01-20 @ 15:58)  HR: 96 (05-02-20 @ 07:50) (95 - 105)  BP: 159/90 (05-02-20 @ 07:50) (143/76 - 159/90)  RR: 22 (05-02-20 @ 07:50) (20 - 22)  SpO2: 100% (05-02-20 @ 07:50) (96% - 100%)  Wt(kg): --  I&O's Summary      REVIEW OF SYSTEMS: denies fever, chills, SOB, palpitations, chest pain, abdominal pain, nausea, vomitting, diarrhea, constipation, dizziness    MEDICATIONS  (STANDING):  busPIRone 10 milliGRAM(s) Oral three times a day  calcium carbonate 1250 mG  + Vitamin D (OsCal 500 + D) 1 Tablet(s) Oral daily  dextrose 5%. 1000 milliLiter(s) (60 mL/Hr) IV Continuous <Continuous>  enoxaparin Injectable 40 milliGRAM(s) SubCutaneous daily  fluvoxaMINE 25 milliGRAM(s) Oral at bedtime  levothyroxine Injectable 12.5 MICROGram(s) IV Push <User Schedule>  mirtazapine 45 milliGRAM(s) Oral at bedtime  potassium chloride    Tablet ER 40 milliEquivalent(s) Oral once    MEDICATIONS  (PRN):  acetaminophen   Tablet .. 650 milliGRAM(s) Oral every 6 hours PRN Temp greater or equal to 38C (100.4F), Mild Pain (1 - 3)  guaiFENesin   Syrup  (Sugar-Free) 100 milliGRAM(s) Oral every 6 hours PRN Cough      PHYSICAL EXAM:  GENERAL: NAD, well-groomed, well-developed  HEAD:  Atraumatic, Normocephalic  EYES: EOMI, PERRLA, conjunctiva and sclera clear  ENMT: No tonsillar erythema, exudates, or enlargement; Moist mucous membranes, Good dentition, No lesions  NECK: Supple, No JVD, Normal thyroid  NERVOUS SYSTEM:  Alert & Oriented X3, Good concentration; Motor Strength 5/5 B/L upper and lower extremities; DTRs 2+ intact and symmetric  CHEST/LUNG: Clear to percussion bilaterally; No rales, rhonchi, wheezing, or rubs  HEART: Regular rate and rhythm; No murmurs, rubs, or gallops  ABDOMEN: Soft, Nontender, Nondistended; Bowel sounds present  EXTREMITIES:  2+ Peripheral Pulses, No clubbing, cyanosis, or edema  LYMPH: No lymphadenopathy noted  SKIN: No rashes or lesions  LABS:                        12.7   10.11 )-----------( 209      ( 02 May 2020 10:57 )             38.6     05-02    150<H>  |  116<H>  |  19<H>  ----------------------------<  98  3.5   |  27  |  0.59    Ca    9.1      02 May 2020 10:57  Phos  1.6     05-01  Mg     2.3     05-01    TPro  7.1  /  Alb  2.5<L>  /  TBili  0.6  /  DBili  x   /  AST  51<H>  /  ALT  62<H>  /  AlkPhos  113  05-02        CAPILLARY BLOOD GLUCOSE

## 2020-05-02 NOTE — PROGRESS NOTE ADULT - SUBJECTIVE AND OBJECTIVE BOX
Time of Visit:  Patient seen and examined.     MEDICATIONS  (STANDING):  busPIRone 10 milliGRAM(s) Oral three times a day  calcium carbonate 1250 mG  + Vitamin D (OsCal 500 + D) 1 Tablet(s) Oral daily  dextrose 5%. 1000 milliLiter(s) (60 mL/Hr) IV Continuous <Continuous>  enoxaparin Injectable 40 milliGRAM(s) SubCutaneous daily  fluvoxaMINE 25 milliGRAM(s) Oral at bedtime  levothyroxine Injectable 12.5 MICROGram(s) IV Push <User Schedule>  mirtazapine 45 milliGRAM(s) Oral at bedtime      MEDICATIONS  (PRN):  acetaminophen   Tablet .. 650 milliGRAM(s) Oral every 6 hours PRN Temp greater or equal to 38C (100.4F), Mild Pain (1 - 3)  guaiFENesin   Syrup  (Sugar-Free) 100 milliGRAM(s) Oral every 6 hours PRN Cough       Medications up to date at time of exam.      PHYSICAL EXAMINATION:  Patient has no new complaints.  GENERAL: The patient is a well-developed, well-nourished, in no apparent distress.     Vital Signs Last 24 Hrs  T(C): 36.6 (02 May 2020 15:51), Max: 36.8 (02 May 2020 07:50)  T(F): 97.8 (02 May 2020 15:51), Max: 98.2 (02 May 2020 07:50)  HR: 95 (02 May 2020 15:51) (95 - 96)  BP: 142/78 (02 May 2020 15:51) (142/78 - 159/90)  BP(mean): --  RR: 22 (02 May 2020 15:51) (20 - 22)  SpO2: 100% (02 May 2020 15:51) (96% - 100%)   (if applicable)    Chest Tube (if applicable)    HEENT: Head is normocephalic and atraumatic. Extraocular muscles are intact. Mucous membranes are moist.     NECK: Supple, no palpable adenopathy.    LUNGS: Clear to auscultation, no wheezing, rales, or rhonchi.    HEART: Regular rate and rhythm without murmur.    ABDOMEN: Soft, nontender, and nondistended.  No hepatosplenomegaly is noted.    : No painful voiding, no pelvic pain    EXTREMITIES: Without any cyanosis, clubbing, rash, lesions or edema.    NEUROLOGIC: Awake, but confused    SKIN: Warm, dry, good turgor.      LABS:                        12.7   10.11 )-----------( 209      ( 02 May 2020 10:57 )             38.6     05-02    150<H>  |  116<H>  |  19<H>  ----------------------------<  98  3.5   |  27  |  0.59    Ca    9.1      02 May 2020 10:57  Phos  1.6     05-01  Mg     2.3     05-01    TPro  7.1  /  Alb  2.5<L>  /  TBili  0.6  /  DBili  x   /  AST  51<H>  /  ALT  62<H>  /  AlkPhos  113  05-02                    Procalcitonin, Serum: 0.14 ng/mL (05-01-20 @ 04:05)      MICROBIOLOGY: (if applicable)    RADIOLOGY & ADDITIONAL STUDIES:  EKG:   CXR:  ECHO:    IMPRESSION: 74y Female PAST MEDICAL & SURGICAL HISTORY:  Dementia  No significant past surgical history   p/w         IMP: This is a 74 yr old woman  from [facility], [facility] of dementia, hypothyroidism, presented to ED due to AMS and hypoxia. Pt. is non-verbal, awake, does not respond to tactile/verbal stimuli, unable to obtain history. Pt. was discharged on 27th April after being treated for COVID. As per NH chart, pt. was sent due to altered mental status with hypoxia. Here, pt. was S02 95% on NRB on arrival. Spoke to son, who states pt. has been deteriorating and has not been responsive while video chatting for few days and understands the poor prognosis and wants her to be comfortable as possible.     Pat with acute hypoxic resp failure due to b/l pna secondary to covid-19 infection. AMS/ encephalopathy due to hypoxia in combination with hypernatremia  CT head is neg for acute processes.  CXR shows hyper-inflation possible due to smoke esposure.     -acute hypoxic resp failure  -b/l pna  -covid-19 infection  -AMS/Encephalopathy  -hypothyroid   -hyper-natremia      Plan:  -continue O2 supp  -correct hypernatremia  -isolation; contact and airborne  -monitor Na  -asp precaution  -agreed with DNR/DNI  -no steroids for now  -albuter 2 puff q6h   -dvt/gi prophy    case discussed with Dr christie

## 2020-05-03 LAB
ALBUMIN SERPL ELPH-MCNC: 2.3 G/DL — LOW (ref 3.5–5)
ALP SERPL-CCNC: 112 U/L — SIGNIFICANT CHANGE UP (ref 40–120)
ALT FLD-CCNC: 49 U/L DA — SIGNIFICANT CHANGE UP (ref 10–60)
ANION GAP SERPL CALC-SCNC: 7 MMOL/L — SIGNIFICANT CHANGE UP (ref 5–17)
AST SERPL-CCNC: 37 U/L — SIGNIFICANT CHANGE UP (ref 10–40)
BILIRUB SERPL-MCNC: 0.6 MG/DL — SIGNIFICANT CHANGE UP (ref 0.2–1.2)
BUN SERPL-MCNC: 19 MG/DL — HIGH (ref 7–18)
CALCIUM SERPL-MCNC: 9.3 MG/DL — SIGNIFICANT CHANGE UP (ref 8.4–10.5)
CHLORIDE SERPL-SCNC: 112 MMOL/L — HIGH (ref 96–108)
CO2 SERPL-SCNC: 28 MMOL/L — SIGNIFICANT CHANGE UP (ref 22–31)
CREAT SERPL-MCNC: 0.62 MG/DL — SIGNIFICANT CHANGE UP (ref 0.5–1.3)
GLUCOSE SERPL-MCNC: 147 MG/DL — HIGH (ref 70–99)
HCT VFR BLD CALC: 40.6 % — SIGNIFICANT CHANGE UP (ref 34.5–45)
HGB BLD-MCNC: 13.2 G/DL — SIGNIFICANT CHANGE UP (ref 11.5–15.5)
MAGNESIUM SERPL-MCNC: 2.2 MG/DL — SIGNIFICANT CHANGE UP (ref 1.6–2.6)
MCHC RBC-ENTMCNC: 29.7 PG — SIGNIFICANT CHANGE UP (ref 27–34)
MCHC RBC-ENTMCNC: 32.5 GM/DL — SIGNIFICANT CHANGE UP (ref 32–36)
MCV RBC AUTO: 91.2 FL — SIGNIFICANT CHANGE UP (ref 80–100)
NRBC # BLD: 0 /100 WBCS — SIGNIFICANT CHANGE UP (ref 0–0)
PHOSPHATE SERPL-MCNC: 1.8 MG/DL — LOW (ref 2.5–4.5)
PLATELET # BLD AUTO: 227 K/UL — SIGNIFICANT CHANGE UP (ref 150–400)
POTASSIUM SERPL-MCNC: 3.5 MMOL/L — SIGNIFICANT CHANGE UP (ref 3.5–5.3)
POTASSIUM SERPL-SCNC: 3.5 MMOL/L — SIGNIFICANT CHANGE UP (ref 3.5–5.3)
PROT SERPL-MCNC: 7.2 G/DL — SIGNIFICANT CHANGE UP (ref 6–8.3)
RBC # BLD: 4.45 M/UL — SIGNIFICANT CHANGE UP (ref 3.8–5.2)
RBC # FLD: 11.5 % — SIGNIFICANT CHANGE UP (ref 10.3–14.5)
SODIUM SERPL-SCNC: 147 MMOL/L — HIGH (ref 135–145)
WBC # BLD: 10.27 K/UL — SIGNIFICANT CHANGE UP (ref 3.8–10.5)
WBC # FLD AUTO: 10.27 K/UL — SIGNIFICANT CHANGE UP (ref 3.8–10.5)

## 2020-05-03 RX ORDER — POTASSIUM PHOSPHATE, MONOBASIC POTASSIUM PHOSPHATE, DIBASIC 236; 224 MG/ML; MG/ML
15 INJECTION, SOLUTION INTRAVENOUS ONCE
Refills: 0 | Status: COMPLETED | OUTPATIENT
Start: 2020-05-03 | End: 2020-05-03

## 2020-05-03 RX ADMIN — MIRTAZAPINE 45 MILLIGRAM(S): 45 TABLET, ORALLY DISINTEGRATING ORAL at 21:25

## 2020-05-03 RX ADMIN — FLUVOXAMINE MALEATE 25 MILLIGRAM(S): 25 TABLET ORAL at 21:25

## 2020-05-03 RX ADMIN — ENOXAPARIN SODIUM 40 MILLIGRAM(S): 100 INJECTION SUBCUTANEOUS at 21:26

## 2020-05-03 RX ADMIN — POTASSIUM PHOSPHATE, MONOBASIC POTASSIUM PHOSPHATE, DIBASIC 62.5 MILLIMOLE(S): 236; 224 INJECTION, SOLUTION INTRAVENOUS at 14:19

## 2020-05-03 RX ADMIN — Medication 1 TABLET(S): at 12:10

## 2020-05-03 RX ADMIN — Medication 10 MILLIGRAM(S): at 14:19

## 2020-05-03 RX ADMIN — Medication 10 MILLIGRAM(S): at 21:25

## 2020-05-03 NOTE — PROGRESS NOTE ADULT - SUBJECTIVE AND OBJECTIVE BOX
Time of Visit:  Patient seen and examined.     MEDICATIONS  (STANDING):  busPIRone 10 milliGRAM(s) Oral three times a day  calcium carbonate 1250 mG  + Vitamin D (OsCal 500 + D) 1 Tablet(s) Oral daily  dextrose 5%. 1000 milliLiter(s) (60 mL/Hr) IV Continuous <Continuous>  enoxaparin Injectable 40 milliGRAM(s) SubCutaneous daily  fluvoxaMINE 25 milliGRAM(s) Oral at bedtime  levothyroxine Injectable 12.5 MICROGram(s) IV Push <User Schedule>  mirtazapine 45 milliGRAM(s) Oral at bedtime      MEDICATIONS  (PRN):  acetaminophen   Tablet .. 650 milliGRAM(s) Oral every 6 hours PRN Temp greater or equal to 38C (100.4F), Mild Pain (1 - 3)  guaiFENesin   Syrup  (Sugar-Free) 100 milliGRAM(s) Oral every 6 hours PRN Cough       Medications up to date at time of exam.      PHYSICAL EXAMINATION:  Patient has no new complaints.  GENERAL: The patient is a well-developed, well-nourished, in no apparent distress.     Vital Signs Last 24 Hrs  T(C): 36.7 (03 May 2020 16:08), Max: 37.4 (03 May 2020 07:55)  T(F): 98 (03 May 2020 16:08), Max: 99.3 (03 May 2020 07:55)  HR: 104 (03 May 2020 16:08) (99 - 104)  BP: 152/96 (03 May 2020 16:08) (120/64 - 152/96)  BP(mean): --  RR: 16 (03 May 2020 16:08) (16 - 20)  SpO2: 96% (03 May 2020 16:08) (96% - 98%)   (if applicable)    Chest Tube (if applicable)    HEENT: Head is normocephalic and atraumatic. Extraocular muscles are intact. Mucous membranes are moist.     NECK: Supple, no palpable adenopathy.    LUNGS: Clear to auscultation, no wheezing, rales, or rhonchi.    HEART: Regular rate and rhythm without murmur.    ABDOMEN: Soft, nontender, and nondistended.  No hepatosplenomegaly is noted.    : No painful voiding, no pelvic pain    EXTREMITIES: Without any cyanosis, clubbing, rash, lesions or edema.    NEUROLOGIC: Awake, none verbal    SKIN: Warm, dry, good turgor.      LABS:                        13.2   10.27 )-----------( 227      ( 03 May 2020 08:11 )             40.6     05-03    147<H>  |  112<H>  |  19<H>  ----------------------------<  147<H>  3.5   |  28  |  0.62    Ca    9.3      03 May 2020 08:11  Phos  1.8     05-03  Mg     2.2     05-03    TPro  7.2  /  Alb  2.3<L>  /  TBili  0.6  /  DBili  x   /  AST  37  /  ALT  49  /  AlkPhos  112  05-03                    Procalcitonin, Serum: 0.14 ng/mL (05-01-20 @ 04:05)      MICROBIOLOGY: (if applicable)    RADIOLOGY & ADDITIONAL STUDIES:  EKG:   CXR:  ECHO:    IMPRESSION: 74y Female PAST MEDICAL & SURGICAL HISTORY:  Dementia  No significant past surgical history   p/w         IMP: This is a 74 yr old woman  from [facility], [facility] of dementia, hypothyroidism, presented to ED due to AMS and hypoxia. Pt. is non-verbal, awake, does not respond to tactile/verbal stimuli, unable to obtain history. Pt. was discharged on 27th April after being treated for COVID. As per NH chart, pt. was sent due to altered mental status with hypoxia. Here, pt. was S02 95% on NRB on arrival. Spoke to son, who states pt. has been deteriorating and has not been responsive while video chatting for few days and understands the poor prognosis and wants her to be comfortable as possible.     Pat with acute hypoxic resp failure due to b/l pna secondary to covid-19 infection. AMS/ encephalopathy due to hypoxia in combination with hypernatremia  CT head is neg for acute processes.  CXR shows hyper-inflation possible due to smoke esposure.     -acute hypoxic resp failure  -b/l pna  -covid-19 infection  -AMS/Encephalopathy  -hypothyroid   -hyper-natremia      Plan:  -continue O2 supp  -correct hypernatremia  -isolation; contact and airborne  -monitor Na  -asp precaution  -agreed with DNR/DNI  -no steroids for now  -albuter 2 puff q6h   -dvt/gi prophy

## 2020-05-03 NOTE — PROGRESS NOTE ADULT - ASSESSMENT
_________________________________________________________________________________________  ========>>  M E D I C A L   A T T E N D I N G    F O L L O W  U P  N O T E  <<=========  -----------------------------------------------------------------------------------------------------    - Patient seen and examined by me approximately sixty minutes ago.  (covering today)   - In summary,  SUSANA MADRID is a 74y year old woman who originally presented with AMS, hypoxia  - Patient today overall doing ok, comfortable, no major events reported / noted otherwise     ==================>> REVIEW OF SYSTEM <<=================    limited ROS   denies pain or discomfort, SOB, cough, nausea, abs pain...     ==================>> PHYSICAL EXAM <<=================    GEN: awake and responsive,  NAD , comfortable  HEENT: NCAT, PERRL, MMM, hearing intact  Neck: supple , no JVD appreciated  CVS: S1S2 , regular , No M/R/G appreciated  PULM: CTA B/L,  limited exam as not taking deep breaths   ABD.: soft. non tender, non distended  Extrem: intact pulses , no edema   PSYCH : normal mood,  not anxious      ==================>> MEDICATIONS <<====================    MEDICATIONS  (STANDING):  busPIRone 10 milliGRAM(s) Oral three times a day  calcium carbonate 1250 mG  + Vitamin D (OsCal 500 + D) 1 Tablet(s) Oral daily  dextrose 5%. 1000 milliLiter(s) (60 mL/Hr) IV Continuous <Continuous>  enoxaparin Injectable 40 milliGRAM(s) SubCutaneous daily  fluvoxaMINE 25 milliGRAM(s) Oral at bedtime  levothyroxine Injectable 12.5 MICROGram(s) IV Push <User Schedule>  mirtazapine 45 milliGRAM(s) Oral at bedtime    MEDICATIONS  (PRN):  acetaminophen   Tablet .. 650 milliGRAM(s) Oral every 6 hours PRN Temp greater or equal to 38C (100.4F), Mild Pain (1 - 3)  guaiFENesin   Syrup  (Sugar-Free) 100 milliGRAM(s) Oral every 6 hours PRN Cough    ==================>> VITAL SIGNS <<==================    T(C): 37.4 (05-03-20 @ 07:55), Max: 37.4 (05-03-20 @ 07:55)  HR: 101 (05-03-20 @ 07:55) (95 - 101)  BP: 126/66 (05-03-20 @ 07:55) (120/64 - 142/78)  RR: 169 (05-03-20 @ 07:55) (17 - 169)  SpO2: 98% (05-03-20 @ 00:43) (97% - 100%)      ==================>> LAB AND IMAGING <<==================                        13.2   10.27 )-----------( 227      ( 03 May 2020 08:11 )             40.6        147<H>  |  112<H>  |  19<H>  ----------------------------<  147<H>  3.5   |  28  |  0.62    Ca    9.3      03 May 2020 08:11  Phos  1.8     05-03  Mg     2.2     05-03    TPro  7.2  /  Alb  2.3<L>  /  TBili  0.6  /  DBili  x   /  AST  37  /  ALT  49  /  AlkPhos  112  05-03      TSH:      0.48   (05-01-20)       ,     1.63   (04-25-20)           Lipid profile:  (05-01-20)     Total: 138     LDL  : 72     HDL  :43     TG   :115     HgA1C:     (05-01-20)      5.6    ___________________________________________________________________________________  ===============>>  A S S E S S M E N T   A N D   P L A N <<===============  ------------------------------------------------------------------------------------------    pt overall doing ok  Oxygen requirements  decreased / stable  hypernatremia improved   encourage Po intake and hydraiotn with aspiration precautions  appreciated pulm f.u  Continue Current medications otherwise and monitor.   supportive care     -GI/DVT Prophylaxis.    ___________________________  HAngelica Nogueira D.O.  (covering today)   Pager: [phone]

## 2020-05-04 LAB
ALBUMIN SERPL ELPH-MCNC: 2.2 G/DL — LOW (ref 3.5–5)
ALP SERPL-CCNC: 108 U/L — SIGNIFICANT CHANGE UP (ref 40–120)
ALT FLD-CCNC: 39 U/L DA — SIGNIFICANT CHANGE UP (ref 10–60)
ANION GAP SERPL CALC-SCNC: 9 MMOL/L — SIGNIFICANT CHANGE UP (ref 5–17)
AST SERPL-CCNC: 31 U/L — SIGNIFICANT CHANGE UP (ref 10–40)
BILIRUB SERPL-MCNC: 0.6 MG/DL — SIGNIFICANT CHANGE UP (ref 0.2–1.2)
BUN SERPL-MCNC: 15 MG/DL — SIGNIFICANT CHANGE UP (ref 7–18)
CALCIUM SERPL-MCNC: 9.1 MG/DL — SIGNIFICANT CHANGE UP (ref 8.4–10.5)
CHLORIDE SERPL-SCNC: 107 MMOL/L — SIGNIFICANT CHANGE UP (ref 96–108)
CO2 SERPL-SCNC: 27 MMOL/L — SIGNIFICANT CHANGE UP (ref 22–31)
CREAT SERPL-MCNC: 0.59 MG/DL — SIGNIFICANT CHANGE UP (ref 0.5–1.3)
D DIMER BLD IA.RAPID-MCNC: 304 NG/ML DDU — HIGH
FERRITIN SERPL-MCNC: 1242 NG/ML — HIGH (ref 15–150)
GLUCOSE SERPL-MCNC: 128 MG/DL — HIGH (ref 70–99)
HCT VFR BLD CALC: 36.3 % — SIGNIFICANT CHANGE UP (ref 34.5–45)
HGB BLD-MCNC: 12 G/DL — SIGNIFICANT CHANGE UP (ref 11.5–15.5)
LDH SERPL L TO P-CCNC: 339 U/L — HIGH (ref 120–225)
MAGNESIUM SERPL-MCNC: 2 MG/DL — SIGNIFICANT CHANGE UP (ref 1.6–2.6)
MCHC RBC-ENTMCNC: 29.9 PG — SIGNIFICANT CHANGE UP (ref 27–34)
MCHC RBC-ENTMCNC: 33.1 GM/DL — SIGNIFICANT CHANGE UP (ref 32–36)
MCV RBC AUTO: 90.5 FL — SIGNIFICANT CHANGE UP (ref 80–100)
NRBC # BLD: 0 /100 WBCS — SIGNIFICANT CHANGE UP (ref 0–0)
PHOSPHATE SERPL-MCNC: 2.9 MG/DL — SIGNIFICANT CHANGE UP (ref 2.5–4.5)
PLATELET # BLD AUTO: 234 K/UL — SIGNIFICANT CHANGE UP (ref 150–400)
POTASSIUM SERPL-MCNC: 3.8 MMOL/L — SIGNIFICANT CHANGE UP (ref 3.5–5.3)
POTASSIUM SERPL-SCNC: 3.8 MMOL/L — SIGNIFICANT CHANGE UP (ref 3.5–5.3)
PROCALCITONIN SERPL-MCNC: 0.11 NG/ML — HIGH (ref 0.02–0.1)
PROT SERPL-MCNC: 6.9 G/DL — SIGNIFICANT CHANGE UP (ref 6–8.3)
RBC # BLD: 4.01 M/UL — SIGNIFICANT CHANGE UP (ref 3.8–5.2)
RBC # FLD: 11.4 % — SIGNIFICANT CHANGE UP (ref 10.3–14.5)
SODIUM SERPL-SCNC: 143 MMOL/L — SIGNIFICANT CHANGE UP (ref 135–145)
WBC # BLD: 9.24 K/UL — SIGNIFICANT CHANGE UP (ref 3.8–10.5)
WBC # FLD AUTO: 9.24 K/UL — SIGNIFICANT CHANGE UP (ref 3.8–10.5)

## 2020-05-04 PROCEDURE — 71045 X-RAY EXAM CHEST 1 VIEW: CPT | Mod: 26

## 2020-05-04 PROCEDURE — 74019 RADEX ABDOMEN 2 VIEWS: CPT | Mod: 26

## 2020-05-04 RX ORDER — AMLODIPINE BESYLATE 2.5 MG/1
2.5 TABLET ORAL DAILY
Refills: 0 | Status: DISCONTINUED | OUTPATIENT
Start: 2020-05-04 | End: 2020-05-09

## 2020-05-04 RX ORDER — LEVOTHYROXINE SODIUM 125 MCG
25 TABLET ORAL DAILY
Refills: 0 | Status: DISCONTINUED | OUTPATIENT
Start: 2020-05-04 | End: 2020-05-09

## 2020-05-04 RX ADMIN — FLUVOXAMINE MALEATE 25 MILLIGRAM(S): 25 TABLET ORAL at 21:41

## 2020-05-04 RX ADMIN — Medication 10 MILLIGRAM(S): at 21:41

## 2020-05-04 RX ADMIN — Medication 10 MILLIGRAM(S): at 06:23

## 2020-05-04 RX ADMIN — Medication 10 MILLIGRAM(S): at 14:02

## 2020-05-04 RX ADMIN — Medication 0.5 MILLIGRAM(S): at 01:10

## 2020-05-04 RX ADMIN — Medication 1 TABLET(S): at 14:02

## 2020-05-04 RX ADMIN — ENOXAPARIN SODIUM 40 MILLIGRAM(S): 100 INJECTION SUBCUTANEOUS at 21:41

## 2020-05-04 RX ADMIN — MIRTAZAPINE 45 MILLIGRAM(S): 45 TABLET, ORALLY DISINTEGRATING ORAL at 21:41

## 2020-05-04 RX ADMIN — Medication 12.5 MICROGRAM(S): at 06:23

## 2020-05-04 NOTE — PROGRESS NOTE ADULT - SUBJECTIVE AND OBJECTIVE BOX
HPI:  74F from [facility], [facility] of dementia, hypothyroidism, presented to ED due to AMS and hypoxia. Pt. is non-verbal, awake, does not respond to tactile/verbal stimuli, unable to obtain history. Pt. was discharged on 27th April after being treated for COVID. As per NH chart, pt. was sent due to altered mental status with hypoxia. Here, pt. was S02 95% on NRB on arrival. Spoke to son, who states pt. has been deteriorating and has not been responsive while video chatting for few days and understands the poor prognosis and wants her to be comfortable as possible. (30 Apr 2020 17:06)      Patient is a 74y old  Female who presents with a chief complaint of AMS and hypoxia (03 May 2020 17:52)      INTERVAL HPI/OVERNIGHT EVENTS:  T(C): 36.8 (05-04-20 @ 07:16), Max: 36.8 (05-04-20 @ 07:16)  HR: 87 (05-04-20 @ 07:16) (87 - 104)  BP: 174/72 (05-04-20 @ 07:16) (145/88 - 174/72)  RR: 26 (05-04-20 @ 07:16) (16 - 26)  SpO2: 100% (05-04-20 @ 07:16) (87% - 100%)  Wt(kg): --  I&O's Summary      REVIEW OF SYSTEMS: denies fever, chills, SOB, palpitations, chest pain, abdominal pain, nausea, vomitting, diarrhea, constipation, dizziness    MEDICATIONS  (STANDING):  amLODIPine   Tablet 2.5 milliGRAM(s) Oral daily  busPIRone 10 milliGRAM(s) Oral three times a day  calcium carbonate 1250 mG  + Vitamin D (OsCal 500 + D) 1 Tablet(s) Oral daily  dextrose 5%. 1000 milliLiter(s) (60 mL/Hr) IV Continuous <Continuous>  enoxaparin Injectable 40 milliGRAM(s) SubCutaneous daily  fluvoxaMINE 25 milliGRAM(s) Oral at bedtime  levothyroxine Injectable 12.5 MICROGram(s) IV Push <User Schedule>  mirtazapine 45 milliGRAM(s) Oral at bedtime    MEDICATIONS  (PRN):  acetaminophen   Tablet .. 650 milliGRAM(s) Oral every 6 hours PRN Temp greater or equal to 38C (100.4F), Mild Pain (1 - 3)  guaiFENesin   Syrup  (Sugar-Free) 100 milliGRAM(s) Oral every 6 hours PRN Cough      PHYSICAL EXAM:  GENERAL: NAD, well-groomed, well-developed  HEAD:  Atraumatic, Normocephalic  EYES: EOMI, PERRLA, conjunctiva and sclera clear  ENMT: No tonsillar erythema, exudates, or enlargement; Moist mucous membranes, Good dentition, No lesions  NECK: Supple, No JVD, Normal thyroid  NERVOUS SYSTEM:  Alert & Oriented X3, Good concentration; Motor Strength 5/5 B/L upper and lower extremities; DTRs 2+ intact and symmetric  CHEST/LUNG: Clear to percussion bilaterally; No rales, rhonchi, wheezing, or rubs  HEART: Regular rate and rhythm; No murmurs, rubs, or gallops  ABDOMEN: Soft, Nontender, Nondistended; Bowel sounds present  EXTREMITIES:  2+ Peripheral Pulses, No clubbing, cyanosis, or edema  LYMPH: No lymphadenopathy noted  SKIN: No rashes or lesions  LABS:                        12.0   9.24  )-----------( 234      ( 04 May 2020 07:03 )             36.3     05-04    143  |  107  |  15  ----------------------------<  128<H>  3.8   |  27  |  0.59    Ca    9.1      04 May 2020 07:03  Phos  2.9     05-04  Mg     2.0     05-04    TPro  6.9  /  Alb  2.2<L>  /  TBili  0.6  /  DBili  x   /  AST  31  /  ALT  39  /  AlkPhos  108  05-04        CAPILLARY BLOOD GLUCOSE

## 2020-05-04 NOTE — CHART NOTE - NSCHARTNOTEFT_GEN_A_CORE
PC Rachel and PC NP spoke with patient's son Varinder Cole who stated he located a living will specifying his mother would not want CPR, intubation or a feeding tube.  This was indicated on the MOLST form and PC NP made the primary team aware.  Mr. Cole expressed appreciation of the support and denied addl. needs at this time.

## 2020-05-04 NOTE — CHART NOTE - NSCHARTNOTEFT_GEN_A_CORE
Palliative Care:    Spoke with pt's son Varinder Cole via phone ( [phone]) he stated his mother has a living trust noting the patient's advanced care planning.  She does not want CPR, intubation or feeding tube.  MOLST drafted per patient's wishes.  DNR/DNI/No feeding tubes. Continue conservative medical management.  Primary team aware.

## 2020-05-04 NOTE — PROGRESS NOTE ADULT - SUBJECTIVE AND OBJECTIVE BOX
Time of Visit:  Patient seen and examined.     MEDICATIONS  (STANDING):  amLODIPine   Tablet 2.5 milliGRAM(s) Oral daily  busPIRone 10 milliGRAM(s) Oral three times a day  calcium carbonate 1250 mG  + Vitamin D (OsCal 500 + D) 1 Tablet(s) Oral daily  dextrose 5%. 1000 milliLiter(s) (60 mL/Hr) IV Continuous <Continuous>  enoxaparin Injectable 40 milliGRAM(s) SubCutaneous daily  fluvoxaMINE 25 milliGRAM(s) Oral at bedtime  levothyroxine 25 MICROGram(s) Oral daily  mirtazapine 45 milliGRAM(s) Oral at bedtime      MEDICATIONS  (PRN):  acetaminophen   Tablet .. 650 milliGRAM(s) Oral every 6 hours PRN Temp greater or equal to 38C (100.4F), Mild Pain (1 - 3)  guaiFENesin   Syrup  (Sugar-Free) 100 milliGRAM(s) Oral every 6 hours PRN Cough       Medications up to date at time of exam.      PHYSICAL EXAMINATION:  Patient has no new complaints.  GENERAL: The patient is a well-developed, well-nourished, in no apparent distress.     Vital Signs Last 24 Hrs  T(C): 36.8 (04 May 2020 07:16), Max: 36.8 (04 May 2020 07:16)  T(F): 98.2 (04 May 2020 07:16), Max: 98.2 (04 May 2020 07:16)  HR: 100 (04 May 2020 12:48) (87 - 104)  BP: 153/58 (04 May 2020 12:48) (145/88 - 174/72)  BP(mean): --  RR: 20 (04 May 2020 12:48) (18 - 26)  SpO2: 97% (04 May 2020 12:48) (87% - 100%)   (if applicable)    Chest Tube (if applicable)    HEENT: Head is normocephalic and atraumatic. Extraocular muscles are intact. Mucous membranes are moist.     NECK: Supple, no palpable adenopathy.    LUNGS: Clear to auscultation, no wheezing, rales, or rhonchi.    HEART: Regular rate and rhythm without murmur.    ABDOMEN: Soft, nontender, and nondistended.  No hepatosplenomegaly is noted.    : No painful voiding, no pelvic pain    EXTREMITIES: Without any cyanosis, clubbing, rash, lesions or edema.    NEUROLOGIC: Awake, alert, oriented, grossly intact    SKIN: Warm, dry, good turgor.      LABS:                        12.0   9.24  )-----------( 234      ( 04 May 2020 07:03 )             36.3     05-04    143  |  107  |  15  ----------------------------<  128<H>  3.8   |  27  |  0.59    Ca    9.1      04 May 2020 07:03  Phos  2.9     05-04  Mg     2.0     05-04    TPro  6.9  /  Alb  2.2<L>  /  TBili  0.6  /  DBili  x   /  AST  31  /  ALT  39  /  AlkPhos  108  05-04              D-Dimer Assay, Quantitative: 304 ng/mL DDU (05-04-20 @ 07:03)        Procalcitonin, Serum: 0.11 ng/mL (05-04-20 @ 09:38)      MICROBIOLOGY: (if applicable)    RADIOLOGY & ADDITIONAL STUDIES:  EKG:   CXR:  ECHO:    IMPRESSION: 74y Female PAST MEDICAL & SURGICAL HISTORY:  Dementia  No significant past surgical history   p/w           RECOMMENDATIONS:

## 2020-05-04 NOTE — PROGRESS NOTE ADULT - ASSESSMENT
seen and examined  vs stable on   on bed not in any distress.  physical unchanged   awake but confused    labs noted  acceptable   Covid pneumonia   resp status is ok  apetite is poor  Palletaive called me just now  that pts son doesnt want any Feeding tubes  pt already is DNr DNi

## 2020-05-05 LAB
ANION GAP SERPL CALC-SCNC: 7 MMOL/L — SIGNIFICANT CHANGE UP (ref 5–17)
BUN SERPL-MCNC: 10 MG/DL — SIGNIFICANT CHANGE UP (ref 7–18)
CALCIUM SERPL-MCNC: 8.7 MG/DL — SIGNIFICANT CHANGE UP (ref 8.4–10.5)
CHLORIDE SERPL-SCNC: 104 MMOL/L — SIGNIFICANT CHANGE UP (ref 96–108)
CO2 SERPL-SCNC: 28 MMOL/L — SIGNIFICANT CHANGE UP (ref 22–31)
CREAT SERPL-MCNC: 0.48 MG/DL — LOW (ref 0.5–1.3)
GLUCOSE SERPL-MCNC: 120 MG/DL — HIGH (ref 70–99)
HCT VFR BLD CALC: 35.6 % — SIGNIFICANT CHANGE UP (ref 34.5–45)
HGB BLD-MCNC: 11.8 G/DL — SIGNIFICANT CHANGE UP (ref 11.5–15.5)
MCHC RBC-ENTMCNC: 29.6 PG — SIGNIFICANT CHANGE UP (ref 27–34)
MCHC RBC-ENTMCNC: 33.1 GM/DL — SIGNIFICANT CHANGE UP (ref 32–36)
MCV RBC AUTO: 89.4 FL — SIGNIFICANT CHANGE UP (ref 80–100)
NRBC # BLD: 0 /100 WBCS — SIGNIFICANT CHANGE UP (ref 0–0)
PLATELET # BLD AUTO: 247 K/UL — SIGNIFICANT CHANGE UP (ref 150–400)
POTASSIUM SERPL-MCNC: 3.5 MMOL/L — SIGNIFICANT CHANGE UP (ref 3.5–5.3)
POTASSIUM SERPL-SCNC: 3.5 MMOL/L — SIGNIFICANT CHANGE UP (ref 3.5–5.3)
RBC # BLD: 3.98 M/UL — SIGNIFICANT CHANGE UP (ref 3.8–5.2)
RBC # FLD: 11.2 % — SIGNIFICANT CHANGE UP (ref 10.3–14.5)
SODIUM SERPL-SCNC: 139 MMOL/L — SIGNIFICANT CHANGE UP (ref 135–145)
WBC # BLD: 9.85 K/UL — SIGNIFICANT CHANGE UP (ref 3.8–10.5)
WBC # FLD AUTO: 9.85 K/UL — SIGNIFICANT CHANGE UP (ref 3.8–10.5)

## 2020-05-05 RX ADMIN — ENOXAPARIN SODIUM 40 MILLIGRAM(S): 100 INJECTION SUBCUTANEOUS at 22:40

## 2020-05-05 RX ADMIN — Medication 10 MILLIGRAM(S): at 05:58

## 2020-05-05 RX ADMIN — Medication 10 MILLIGRAM(S): at 22:40

## 2020-05-05 RX ADMIN — Medication 10 MILLIGRAM(S): at 13:46

## 2020-05-05 RX ADMIN — AMLODIPINE BESYLATE 2.5 MILLIGRAM(S): 2.5 TABLET ORAL at 05:58

## 2020-05-05 RX ADMIN — Medication 1 TABLET(S): at 11:49

## 2020-05-05 RX ADMIN — Medication 25 MICROGRAM(S): at 05:58

## 2020-05-05 RX ADMIN — FLUVOXAMINE MALEATE 25 MILLIGRAM(S): 25 TABLET ORAL at 22:41

## 2020-05-05 RX ADMIN — MIRTAZAPINE 45 MILLIGRAM(S): 45 TABLET, ORALLY DISINTEGRATING ORAL at 22:41

## 2020-05-05 NOTE — PROGRESS NOTE ADULT - PROBLEM SELECTOR PLAN 2
> Patient currently on NRB @15L saturating 100%      - Titrate down on oxygen as tolerated,       - Will try decreasing to 10L NRB today and monitor saturation   > Tylenol PRN fever  > Robitussin PRN cough   > Palliative care note seen and appreciated

## 2020-05-05 NOTE — PROGRESS NOTE ADULT - SUBJECTIVE AND OBJECTIVE BOX
HPI:  74F from [facility], [facility] of dementia, hypothyroidism, presented to ED due to AMS and hypoxia. Pt. is non-verbal, awake, does not respond to tactile/verbal stimuli, unable to obtain history. Pt. was discharged on 27th April after being treated for COVID. As per NH chart, pt. was sent due to altered mental status with hypoxia. Here, pt. was S02 95% on NRB on arrival. Spoke to son, who states pt. has been deteriorating and has not been responsive while video chatting for few days and understands the poor prognosis and wants her to be comfortable as possible. (30 Apr 2020 17:06)      Patient is a 74y old  Female who presents with a chief complaint of AMS and hypoxia (05 May 2020 09:35)      INTERVAL HPI/OVERNIGHT EVENTS:  T(C): 36.7 (05-05-20 @ 07:37), Max: 37.1 (05-04-20 @ 16:59)  HR: 83 (05-05-20 @ 07:37) (83 - 114)  BP: 115/59 (05-05-20 @ 07:37) (115/59 - 154/79)  RR: 20 (05-05-20 @ 07:37) (20 - 22)  SpO2: 100% (05-05-20 @ 07:37) (91% - 100%)  Wt(kg): --  I&O's Summary    04 May 2020 07:01  -  05 May 2020 07:00  --------------------------------------------------------  IN: 0 mL / OUT: 2 mL / NET: -2 mL        REVIEW OF SYSTEMS: denies fever, chills, SOB, palpitations, chest pain, abdominal pain, nausea, vomitting, diarrhea, constipation, dizziness    MEDICATIONS  (STANDING):  amLODIPine   Tablet 2.5 milliGRAM(s) Oral daily  busPIRone 10 milliGRAM(s) Oral three times a day  calcium carbonate 1250 mG  + Vitamin D (OsCal 500 + D) 1 Tablet(s) Oral daily  dextrose 5%. 1000 milliLiter(s) (60 mL/Hr) IV Continuous <Continuous>  enoxaparin Injectable 40 milliGRAM(s) SubCutaneous daily  fluvoxaMINE 25 milliGRAM(s) Oral at bedtime  levothyroxine 25 MICROGram(s) Oral daily  mirtazapine 45 milliGRAM(s) Oral at bedtime    MEDICATIONS  (PRN):  acetaminophen   Tablet .. 650 milliGRAM(s) Oral every 6 hours PRN Temp greater or equal to 38C (100.4F), Mild Pain (1 - 3)  guaiFENesin   Syrup  (Sugar-Free) 100 milliGRAM(s) Oral every 6 hours PRN Cough      PHYSICAL EXAM:  GENERAL: NAD, well-groomed, well-developed  HEAD:  Atraumatic, Normocephalic  EYES: EOMI, PERRLA, conjunctiva and sclera clear  ENMT: No tonsillar erythema, exudates, or enlargement; Moist mucous membranes, Good dentition, No lesions  NECK: Supple, No JVD, Normal thyroid  NERVOUS SYSTEM:  Alert & Oriented X3, Good concentration; Motor Strength 5/5 B/L upper and lower extremities; DTRs 2+ intact and symmetric  CHEST/LUNG: Clear to percussion bilaterally; No rales, rhonchi, wheezing, or rubs  HEART: Regular rate and rhythm; No murmurs, rubs, or gallops  ABDOMEN: Soft, Nontender, Nondistended; Bowel sounds present  EXTREMITIES:  2+ Peripheral Pulses, No clubbing, cyanosis, or edema  LYMPH: No lymphadenopathy noted  SKIN: No rashes or lesions  LABS:                        11.8   9.85  )-----------( 247      ( 05 May 2020 10:44 )             35.6     05-05    139  |  104  |  10  ----------------------------<  120<H>  3.5   |  28  |  0.48<L>    Ca    8.7      05 May 2020 10:44  Phos  2.9     05-04  Mg     2.0     05-04    TPro  6.9  /  Alb  2.2<L>  /  TBili  0.6  /  DBili  x   /  AST  31  /  ALT  39  /  AlkPhos  108  05-04        CAPILLARY BLOOD GLUCOSE

## 2020-05-05 NOTE — PROGRESS NOTE ADULT - ASSESSMENT
74F from [facility], [facility] of dementia, hypothyroidism, presented to ED due to AMS and hypoxia.

## 2020-05-05 NOTE — PROGRESS NOTE ADULT - SUBJECTIVE AND OBJECTIVE BOX
Pt Name: SUSANA MADRID  MRN: [id number]      74yFemaleHPI:  Patient seen and evaluated at bedside. Patient confused unable to obtain history or ROS   Patient currently on a NRB @ 15l saturating well.    PHYSICAL EXAM:    Vital Signs Last 24 Hrs  T(C): 36.7 (05 May 2020 07:37), Max: 37.1 (04 May 2020 16:59)  T(F): 98.1 (05 May 2020 07:37), Max: 98.8 (04 May 2020 16:59)  HR: 83 (05 May 2020 07:37) (83 - 114)  BP: 115/59 (05 May 2020 07:37) (115/59 - 154/79)  BP(mean): --  RR: 20 (05 May 2020 07:37) (20 - 22)  SpO2: 100% (05 May 2020 07:37) (91% - 100%)    Gen: NAD, resting in bed   Extremities: no clubbing/cyanosis, no edema, no calf tenderness  Vascular:  DP/PT 2+ b/l, brisk  Neurological: no focal deficits  Skin: no rash on visible skin        LABS:                        12.0   9.24  )-----------( 234      ( 04 May 2020 07:03 )             36.3     05-04    143  |  107  |  15  ----------------------------<  128<H>  3.8   |  27  |  0.59    Ca    9.1      04 May 2020 07:03  Phos  2.9     05-04  Mg     2.0     05-04    TPro  6.9  /  Alb  2.2<L>  /  TBili  0.6  /  DBili  x   /  AST  31  /  ALT  39  /  AlkPhos  108  05-04

## 2020-05-05 NOTE — PROGRESS NOTE ADULT - ASSESSMENT
seen and examined vsstable afebrile physical done more lithargic, on NRBR 10 liter   sating well  will try to put back on 6 lnc  d/w staff and watch her   lungs clear   cns lithargic  holding hands  labs k 3.5  cxr worsening infiltrate  a/p covid pneumonia  O2, pulmonary  supplement K   dnr dni, no feeding tube

## 2020-05-06 RX ADMIN — ENOXAPARIN SODIUM 40 MILLIGRAM(S): 100 INJECTION SUBCUTANEOUS at 21:19

## 2020-05-06 RX ADMIN — FLUVOXAMINE MALEATE 25 MILLIGRAM(S): 25 TABLET ORAL at 21:18

## 2020-05-06 RX ADMIN — MIRTAZAPINE 45 MILLIGRAM(S): 45 TABLET, ORALLY DISINTEGRATING ORAL at 21:18

## 2020-05-06 RX ADMIN — SODIUM CHLORIDE 60 MILLILITER(S): 9 INJECTION, SOLUTION INTRAVENOUS at 18:53

## 2020-05-06 RX ADMIN — Medication 10 MILLIGRAM(S): at 21:18

## 2020-05-06 NOTE — DIETITIAN INITIAL EVALUATION ADULT. - PERTINENT LABORATORY DATA
05-05 Na139 mmol/L Glu 120 mg/dL<H> K+ 3.5 mmol/L Cr  0.48 mg/dL<L> BUN 10 mg/dL 05-04 Phos 2.9 mg/dL 05-04 Alb 2.2 g/dL<L> 05-01 Chol 138 mg/dL LDL 72 mg/dL HDL 43 mg/dL<L> Trig 115 mg/dL

## 2020-05-06 NOTE — DIETITIAN INITIAL EVALUATION ADULT. - FACTORS AFF FOOD INTAKE
change in mental status/difficulty chewing/difficulty feeding self/difficulty swallowing/weakness, altered mental status, Pneumonia duet to organism, dementia, depression, COVID-19+/other (specify)/difficulty with food procurement/preparation

## 2020-05-06 NOTE — PROGRESS NOTE ADULT - ASSESSMENT
seen and examined  vsstable afebrile  not keeping 100 nrb but sating  ok  d/w staff to change to 6 lnc for 10 mnts then   will watch her  supplement K

## 2020-05-06 NOTE — CHART NOTE - NSCHARTNOTEFT_GEN_A_CORE
PC  received a telephone call from the patient's son Varinder Cole [phone] regarding discharge planning.  Mr. Cole stated he would like to speak with the primary team regarding his mothers' medical status as well as the discharge disposition.  JACK Ramos stated the patient has been weaned to nasal cannula, as per the chart, however advised the patient's son to address specific medical questions to the attending MD.  In addition, this  suggested the patient's discharge facility will depend upon her respiratory requirements as well as her functional status.  Mr. Cole expressed hope that his mother maybe able to return to the [facility] and forego [facility].  JACK Ramos stated the Medicine Sw would need to discuss this with him as she is in communication with the facilities.  PC  stated Covid results are relevant to d/c locations as well.  JACK Ramos suggested the patient transition to [facility] and depending upon her functional status either remain there fore residential hospice or return to the [facility] if she is appropriate for that level of care. Education regarding applying for long term medicaid was provided during a prior discussion.  Mr. Cole agreed to speak with Medicine Sw and primary physician. Patient's son denied addl. needs at this time.  JACK Ramos contacted Medicine Sw who stated she will attempt to f/u today, if time allows and or the Sw will f/u tomorrow with patient's son.  PC Sw will remain available as needed.

## 2020-05-06 NOTE — DIETITIAN INITIAL EVALUATION ADULT. - PROBLEM SELECTOR PLAN 2
p/w hypoxia  - Respiratory status- Not in distress, S02 97% on NRB on my evaluation  - T- 101.9  - WBC: WNL, lymphopenia 0.72, transaminitis  - CXR - B/L infiltrates  - Ed course; IV NS 1lt. azithro  - Will hold off antibiotics for now  - Tylenol, Robitussin PRN  - contact and airborne isolation precaution   - FU CRP, ferritin, procalcitonin

## 2020-05-06 NOTE — DIETITIAN INITIAL EVALUATION ADULT. - PROBLEM SELECTOR PLAN 1
sent from NH due to altered mental status  Pt. has dementia  CT head negative for acute pathology  UA negative  Na 151  likely AMS due to COVID and hypernatremia  c/w IVF  NPO  supportive measures  DNR/DNI  Palliative consulted

## 2020-05-06 NOTE — PROGRESS NOTE ADULT - SUBJECTIVE AND OBJECTIVE BOX
HPI:  74F from [facility], [facility] of dementia, hypothyroidism, presented to ED due to AMS and hypoxia. Pt. is non-verbal, awake, does not respond to tactile/verbal stimuli, unable to obtain history. Pt. was discharged on 27th April after being treated for COVID. As per NH chart, pt. was sent due to altered mental status with hypoxia. Here, pt. was S02 95% on NRB on arrival. Spoke to son, who states pt. has been deteriorating and has not been responsive while video chatting for few days and understands the poor prognosis and wants her to be comfortable as possible. (30 Apr 2020 17:06)      Patient is a 74y old  Female who presents with a chief complaint of AMS and hypoxia (06 May 2020 08:23)      INTERVAL HPI/OVERNIGHT EVENTS:  T(C): 37.1 (05-06-20 @ 08:04), Max: 37.1 (05-06-20 @ 08:04)  HR: 99 (05-06-20 @ 08:04) (79 - 116)  BP: 143/107 (05-06-20 @ 08:04) (107/84 - 143/107)  RR: 17 (05-06-20 @ 08:04) (17 - 20)  SpO2: 99% (05-06-20 @ 14:31) (93% - 100%)  Wt(kg): --  I&O's Summary    05 May 2020 07:01  -  06 May 2020 07:00  --------------------------------------------------------  IN: 700 mL / OUT: 0 mL / NET: 700 mL        REVIEW OF SYSTEMS: denies fever, chills, SOB, palpitations, chest pain, abdominal pain, nausea, vomitting, diarrhea, constipation, dizziness    MEDICATIONS  (STANDING):  amLODIPine   Tablet 2.5 milliGRAM(s) Oral daily  busPIRone 10 milliGRAM(s) Oral three times a day  calcium carbonate 1250 mG  + Vitamin D (OsCal 500 + D) 1 Tablet(s) Oral daily  dextrose 5%. 1000 milliLiter(s) (60 mL/Hr) IV Continuous <Continuous>  enoxaparin Injectable 40 milliGRAM(s) SubCutaneous daily  fluvoxaMINE 25 milliGRAM(s) Oral at bedtime  levothyroxine 25 MICROGram(s) Oral daily  mirtazapine 45 milliGRAM(s) Oral at bedtime    MEDICATIONS  (PRN):  acetaminophen   Tablet .. 650 milliGRAM(s) Oral every 6 hours PRN Temp greater or equal to 38C (100.4F), Mild Pain (1 - 3)  guaiFENesin   Syrup  (Sugar-Free) 100 milliGRAM(s) Oral every 6 hours PRN Cough      PHYSICAL EXAM:  GENERAL: NAD, well-groomed, well-developed  HEAD:  Atraumatic, Normocephalic  EYES: EOMI, PERRLA, conjunctiva and sclera clear  ENMT: No tonsillar erythema, exudates, or enlargement; Moist mucous membranes, Good dentition, No lesions  NECK: Supple, No JVD, Normal thyroid  NERVOUS SYSTEM:  Alert & Oriented X3, Good concentration; Motor Strength 5/5 B/L upper and lower extremities; DTRs 2+ intact and symmetric  CHEST/LUNG: Clear to percussion bilaterally; No rales, rhonchi, wheezing, or rubs  HEART: Regular rate and rhythm; No murmurs, rubs, or gallops  ABDOMEN: Soft, Nontender, Nondistended; Bowel sounds present  EXTREMITIES:  2+ Peripheral Pulses, No clubbing, cyanosis, or edema  LYMPH: No lymphadenopathy noted  SKIN: No rashes or lesions  LABS:                        11.8   9.85  )-----------( 247      ( 05 May 2020 10:44 )             35.6     05-05    139  |  104  |  10  ----------------------------<  120<H>  3.5   |  28  |  0.48<L>    Ca    8.7      05 May 2020 10:44          CAPILLARY BLOOD GLUCOSE

## 2020-05-06 NOTE — PROGRESS NOTE ADULT - PROBLEM SELECTOR PLAN 2
> Patient currently on NRB @10L saturating 99%      -  Decreased O2 down to 8L NRB this morning, saturating at 97%. Continue to monitor O2 sat. Titrate down oxygen as tolerated       - Will try decreasing to 10L NRB today and monitor saturation   > Tylenol PRN fever  > Robitussin PRN cough   > Palliative care note seen and appreciated > Patient currently on NRB @10L saturating 99%      -  Decreased O2 down to 8L NRB this morning, saturating at 97%. Continue to monitor O2 sat. Titrate down oxygen as tolerated       > Tylenol PRN fever  > Robitussin PRN cough   > Palliative care note seen and appreciated

## 2020-05-06 NOTE — DIETITIAN INITIAL EVALUATION ADULT. - DIET TYPE
PTA - Nursing diet order - Ground texture with nectar consistency. Nursing to continue feeding assistance and encouragement, aspiration precaution

## 2020-05-06 NOTE — DIETITIAN INITIAL EVALUATION ADULT. - PROBLEM/PLAN-4
-- Message is from the [facility]--    Reason for Call: Patient's wife states the patient only received a 15 day supply (30 tablets) for the medication metoPROLOL tartrate (LOPRESSOR) 100 MG tablet. Wife, Elis, states the patient normally receives a 90 day supply and wife would like to know the reason behind this. Please follow up.          Alternative phone number: [phone]    Turnaround time given to caller:   \"This message will be sent to [state Provider's name]. The clinical team will fulfill your request as soon as they review your message.\"     DISPLAY PLAN FREE TEXT

## 2020-05-06 NOTE — PROGRESS NOTE ADULT - SUBJECTIVE AND OBJECTIVE BOX
Pt Name: SUSANA MADRID  MRN: [id number]      74yFemaleHPI:  Patient seen and evaluated at bedside. Patient confused unable to obtain history or ROS   Patients supplemental oxygen titrated down from 15L -> 10L NRB yesterday, saturating well at 98%-100%      PHYSICAL EXAM:    Vital Signs Last 24 Hrs  T(C): 37.1 (06 May 2020 08:04), Max: 37.1 (06 May 2020 08:04)  T(F): 98.7 (06 May 2020 08:04), Max: 98.7 (06 May 2020 08:04)  HR: 99 (06 May 2020 08:04) (79 - 116)  BP: 143/107 (06 May 2020 08:04) (107/84 - 143/107)  BP(mean): --  RR: 17 (06 May 2020 08:04) (17 - 20)  SpO2: 100% (06 May 2020 08:04) (93% - 100%)    Gen: NAD, resting in bed   Extremities: no clubbing/cyanosis, no edema, no calf tenderness  Vascular:  DP/PT 2+ b/l, brisk  Neurological: no focal deficits  Skin: no rash on visible skin        LABS:                          11.8   9.85  )-----------( 247      ( 05 May 2020 10:44 )             35.6   05-05    139  |  104  |  10  ----------------------------<  120<H>  3.5   |  28  |  0.48<L>    Ca    8.7      05 May 2020 10:44

## 2020-05-06 NOTE — DIETITIAN INITIAL EVALUATION ADULT. - OTHER INFO
Patient from [facility] & recently discharged from [facility] on 4/27/20. Pt. on airborne precaution in isolation - COVID-19+, unable to see, per PCA, pt. confused, needs encouragement at mealtime, consuming 40-50% & tolerating, seen by Speech & Swallow & recommendation noted, encourage po intake, followed by Palliative Care team, skin intact.

## 2020-05-06 NOTE — DIETITIAN INITIAL EVALUATION ADULT. - PERTINENT MEDS FT
MEDICATIONS  (STANDING):  amLODIPine   Tablet 2.5 milliGRAM(s) Oral daily  busPIRone 10 milliGRAM(s) Oral three times a day  calcium carbonate 1250 mG  + Vitamin D (OsCal 500 + D) 1 Tablet(s) Oral daily  dextrose 5%. 1000 milliLiter(s) (60 mL/Hr) IV Continuous <Continuous>  enoxaparin Injectable 40 milliGRAM(s) SubCutaneous daily  fluvoxaMINE 25 milliGRAM(s) Oral at bedtime  levothyroxine 25 MICROGram(s) Oral daily  mirtazapine 45 milliGRAM(s) Oral at bedtime    MEDICATIONS  (PRN):  acetaminophen   Tablet .. 650 milliGRAM(s) Oral every 6 hours PRN Temp greater or equal to 38C (100.4F), Mild Pain (1 - 3)  guaiFENesin   Syrup  (Sugar-Free) 100 milliGRAM(s) Oral every 6 hours PRN Cough

## 2020-05-07 RX ADMIN — FLUVOXAMINE MALEATE 25 MILLIGRAM(S): 25 TABLET ORAL at 21:14

## 2020-05-07 RX ADMIN — ENOXAPARIN SODIUM 40 MILLIGRAM(S): 100 INJECTION SUBCUTANEOUS at 21:14

## 2020-05-07 RX ADMIN — MIRTAZAPINE 45 MILLIGRAM(S): 45 TABLET, ORALLY DISINTEGRATING ORAL at 21:14

## 2020-05-07 RX ADMIN — Medication 25 MICROGRAM(S): at 05:04

## 2020-05-07 RX ADMIN — Medication 1 TABLET(S): at 13:34

## 2020-05-07 RX ADMIN — AMLODIPINE BESYLATE 2.5 MILLIGRAM(S): 2.5 TABLET ORAL at 05:04

## 2020-05-07 RX ADMIN — Medication 10 MILLIGRAM(S): at 05:04

## 2020-05-07 RX ADMIN — Medication 10 MILLIGRAM(S): at 13:34

## 2020-05-07 RX ADMIN — Medication 10 MILLIGRAM(S): at 21:14

## 2020-05-07 NOTE — PROGRESS NOTE ADULT - SUBJECTIVE AND OBJECTIVE BOX
HPI:  74F from [facility], [facility] of dementia, hypothyroidism, presented to ED due to AMS and hypoxia. Pt. is non-verbal, awake, does not respond to tactile/verbal stimuli, unable to obtain history. Pt. was discharged on 27th April after being treated for COVID. As per NH chart, pt. was sent due to altered mental status with hypoxia. Here, pt. was S02 95% on NRB on arrival. Spoke to son, who states pt. has been deteriorating and has not been responsive while video chatting for few days and understands the poor prognosis and wants her to be comfortable as possible. (30 Apr 2020 17:06)      Patient is a 75y old  Female who presents with a chief complaint of AMS and hypoxia (07 May 2020 09:42)      INTERVAL HPI/OVERNIGHT EVENTS:  T(C): 37 (05-07-20 @ 08:27), Max: 37 (05-07-20 @ 08:27)  HR: 90 (05-07-20 @ 08:27) (90 - 117)  BP: 135/61 (05-07-20 @ 08:27) (135/61 - 148/87)  RR: 22 (05-07-20 @ 08:27) (16 - 22)  SpO2: 93% (05-07-20 @ 08:27) (76% - 100%)  Wt(kg): --  I&O's Summary      REVIEW OF SYSTEMS: denies fever, chills, SOB, palpitations, chest pain, abdominal pain, nausea, vomitting, diarrhea, constipation, dizziness    MEDICATIONS  (STANDING):  amLODIPine   Tablet 2.5 milliGRAM(s) Oral daily  busPIRone 10 milliGRAM(s) Oral three times a day  calcium carbonate 1250 mG  + Vitamin D (OsCal 500 + D) 1 Tablet(s) Oral daily  dextrose 5%. 1000 milliLiter(s) (60 mL/Hr) IV Continuous <Continuous>  enoxaparin Injectable 40 milliGRAM(s) SubCutaneous daily  fluvoxaMINE 25 milliGRAM(s) Oral at bedtime  levothyroxine 25 MICROGram(s) Oral daily  mirtazapine 45 milliGRAM(s) Oral at bedtime    MEDICATIONS  (PRN):  acetaminophen   Tablet .. 650 milliGRAM(s) Oral every 6 hours PRN Temp greater or equal to 38C (100.4F), Mild Pain (1 - 3)  guaiFENesin   Syrup  (Sugar-Free) 100 milliGRAM(s) Oral every 6 hours PRN Cough      PHYSICAL EXAM:  GENERAL: NAD, well-groomed, well-developed  HEAD:  Atraumatic, Normocephalic  EYES: EOMI, PERRLA, conjunctiva and sclera clear  ENMT: No tonsillar erythema, exudates, or enlargement; Moist mucous membranes, Good dentition, No lesions  NECK: Supple, No JVD, Normal thyroid  NERVOUS SYSTEM:  Alert & Oriented X3, Good concentration; Motor Strength 5/5 B/L upper and lower extremities; DTRs 2+ intact and symmetric  CHEST/LUNG: Clear to percussion bilaterally; No rales, rhonchi, wheezing, or rubs  HEART: Regular rate and rhythm; No murmurs, rubs, or gallops  ABDOMEN: Soft, Nontender, Nondistended; Bowel sounds present  EXTREMITIES:  2+ Peripheral Pulses, No clubbing, cyanosis, or edema  LYMPH: No lymphadenopathy noted  SKIN: No rashes or lesions  LABS:              CAPILLARY BLOOD GLUCOSE

## 2020-05-07 NOTE — PROGRESS NOTE ADULT - ASSESSMENT
seen and examined on 100 nrbr   sat is ok   vsstable afebrile  awake but confused   physical unchanged   pt is comfort care on 100 nrbr  palleative to revaluate

## 2020-05-07 NOTE — PROGRESS NOTE ADULT - SUBJECTIVE AND OBJECTIVE BOX
Pt Name: SUSANA MADRID  MRN: [id number]      74yFemaleHPI:  Patient seen and evaluated at bedside. Patient confused unable to obtain history or ROS. States she is breathing well.  Patients supplemental oxygen titrated down to 5L at 12am last night but O2 sat decreased to 76%. NRB was then increased back to 10L and O2sat 100%      PHYSICAL EXAM:    Vital Signs Last 24 Hrs  T(C): 37.1 (06 May 2020 08:04), Max: 37.1 (06 May 2020 08:04)  T(F): 98.7 (06 May 2020 08:04), Max: 98.7 (06 May 2020 08:04)  HR: 99 (06 May 2020 08:04) (79 - 116)  BP: 143/107 (06 May 2020 08:04) (107/84 - 143/107)  BP(mean): --  RR: 17 (06 May 2020 08:04) (17 - 20)  SpO2: 100% (06 May 2020 08:04) (93% - 100%)    Gen: NAD, resting in bed   Extremities: no clubbing/cyanosis, no edema, no calf tenderness  Vascular:  DP/PT 2+ b/l, brisk  Neurological: no focal deficits  Skin: no rash on visible skin        LABS:                          11.8   9.85  )-----------( 247      ( 05 May 2020 10:44 )             35.6   05-05    139  |  104  |  10  ----------------------------<  120<H>  3.5   |  28  |  0.48<L>    Ca    8.7      05 May 2020 10:44 Pt Name: SUSANA MADRID  MRN: [id number]      74yFemaleHPI:  Patient seen and evaluated at bedside. Patient confused unable to obtain history or ROS. States she is breathing well.  Patients supplemental oxygen titrated down to 5L at 12am last night but O2 sat decreased to 76%. NRB was then increased back to 10L and O2sat 100%      PHYSICAL EXAM:    Vital Signs Last 24 Hrs  Vital Signs Last 24 Hrs  T(C): 37 (07 May 2020 08:27), Max: 37 (07 May 2020 08:27)  T(F): 98.6 (07 May 2020 08:27), Max: 98.6 (07 May 2020 08:27)  HR: 90 (07 May 2020 08:27) (90 - 117)  BP: 135/61 (07 May 2020 08:27) (135/61 - 148/87)  BP(mean): --  RR: 22 (07 May 2020 08:27) (16 - 22)  SpO2: 93% (07 May 2020 08:27) (76% - 100%)      Gen: NAD, resting in bed   Extremities: no clubbing/cyanosis, no edema, no calf tenderness  Vascular:  DP/PT 2+ b/l, brisk  Neurological: no focal deficits  Skin: no rash on visible skin        LABS:                          11.8   9.85  )-----------( 247      ( 05 May 2020 10:44 )             35.6   05-05    139  |  104  |  10  ----------------------------<  120<H>  3.5   |  28  |  0.48<L>    Ca    8.7      05 May 2020 10:44

## 2020-05-07 NOTE — PROGRESS NOTE ADULT - PROBLEM SELECTOR PLAN 2
> Patient currently on NRB @10L saturating 99%      -Patients supplemental oxygen titrated down to 5L at 12am last night but O2 sat decreased to 76%. NRB was then increased back to 10L and O2sat 100%      > Tylenol PRN fever  > Robitussin PRN cough   > Palliative care note seen and appreciated

## 2020-05-08 RX ORDER — MORPHINE SULFATE 50 MG/1
1 CAPSULE, EXTENDED RELEASE ORAL
Refills: 0 | Status: DISCONTINUED | OUTPATIENT
Start: 2020-05-08 | End: 2020-05-09

## 2020-05-08 RX ADMIN — MIRTAZAPINE 45 MILLIGRAM(S): 45 TABLET, ORALLY DISINTEGRATING ORAL at 21:19

## 2020-05-08 RX ADMIN — Medication 1 MILLIGRAM(S): at 22:26

## 2020-05-08 RX ADMIN — ENOXAPARIN SODIUM 40 MILLIGRAM(S): 100 INJECTION SUBCUTANEOUS at 21:19

## 2020-05-08 RX ADMIN — Medication 1 TABLET(S): at 11:30

## 2020-05-08 RX ADMIN — Medication 10 MILLIGRAM(S): at 21:19

## 2020-05-08 RX ADMIN — AMLODIPINE BESYLATE 2.5 MILLIGRAM(S): 2.5 TABLET ORAL at 05:46

## 2020-05-08 RX ADMIN — FLUVOXAMINE MALEATE 25 MILLIGRAM(S): 25 TABLET ORAL at 21:19

## 2020-05-08 RX ADMIN — Medication 10 MILLIGRAM(S): at 14:14

## 2020-05-08 RX ADMIN — Medication 10 MILLIGRAM(S): at 05:46

## 2020-05-08 RX ADMIN — Medication 25 MICROGRAM(S): at 05:46

## 2020-05-08 NOTE — PROGRESS NOTE ADULT - SUBJECTIVE AND OBJECTIVE BOX
HPI:  74F from [facility], [facility] of dementia, hypothyroidism, presented to ED due to AMS and hypoxia. Pt. is non-verbal, awake, does not respond to tactile/verbal stimuli, unable to obtain history. Pt. was discharged on 27th April after being treated for COVID. As per NH chart, pt. was sent due to altered mental status with hypoxia. Here, pt. was S02 95% on NRB on arrival. Spoke to son, who states pt. has been deteriorating and has not been responsive while video chatting for few days and understands the poor prognosis and wants her to be comfortable as possible. (30 Apr 2020 17:06)      Patient is a 75y old  Female who presents with a chief complaint of AMS and hypoxia (08 May 2020 09:53)      INTERVAL HPI/OVERNIGHT EVENTS:  T(C): 36.8 (05-08-20 @ 15:51), Max: 36.8 (05-08-20 @ 15:51)  HR: 107 (05-08-20 @ 15:51) (89 - 107)  BP: 95/63 (05-08-20 @ 15:51) (95/63 - 143/60)  RR: 20 (05-08-20 @ 15:51) (20 - 21)  SpO2: 94% (05-08-20 @ 15:51) (94% - 95%)  Wt(kg): --  I&O's Summary    07 May 2020 07:01  -  08 May 2020 07:00  --------------------------------------------------------  IN: 80 mL / OUT: 4 mL / NET: 76 mL        REVIEW OF SYSTEMS: denies fever, chills, SOB, palpitations, chest pain, abdominal pain, nausea, vomitting, diarrhea, constipation, dizziness    MEDICATIONS  (STANDING):  amLODIPine   Tablet 2.5 milliGRAM(s) Oral daily  busPIRone 10 milliGRAM(s) Oral three times a day  enoxaparin Injectable 40 milliGRAM(s) SubCutaneous daily  fluvoxaMINE 25 milliGRAM(s) Oral at bedtime  levothyroxine 25 MICROGram(s) Oral daily  mirtazapine 45 milliGRAM(s) Oral at bedtime    MEDICATIONS  (PRN):  acetaminophen   Tablet .. 650 milliGRAM(s) Oral every 6 hours PRN Temp greater or equal to 38C (100.4F), Mild Pain (1 - 3)  guaiFENesin   Syrup  (Sugar-Free) 100 milliGRAM(s) Oral every 6 hours PRN Cough  LORazepam   Injectable 1 milliGRAM(s) IV Push every 4 hours PRN Agitation  morphine  - Injectable 1 milliGRAM(s) IV Push every 2 hours PRN dyspnea      PHYSICAL EXAM:  GENERAL: NAD, well-groomed, well-developed  HEAD:  Atraumatic, Normocephalic  EYES: EOMI, PERRLA, conjunctiva and sclera clear  ENMT: No tonsillar erythema, exudates, or enlargement; Moist mucous membranes, Good dentition, No lesions  NECK: Supple, No JVD, Normal thyroid  NERVOUS SYSTEM:  Alert & Oriented X3, Good concentration; Motor Strength 5/5 B/L upper and lower extremities; DTRs 2+ intact and symmetric  CHEST/LUNG: Clear to percussion bilaterally; No rales, rhonchi, wheezing, or rubs  HEART: Regular rate and rhythm; No murmurs, rubs, or gallops  ABDOMEN: Soft, Nontender, Nondistended; Bowel sounds present  EXTREMITIES:  2+ Peripheral Pulses, No clubbing, cyanosis, or edema  LYMPH: No lymphadenopathy noted  SKIN: No rashes or lesions  LABS:              CAPILLARY BLOOD GLUCOSE

## 2020-05-08 NOTE — PROGRESS NOTE ADULT - ASSESSMENT
seen and examined vsstable afebrile physical unchanged  confused on 100 nrb  no new labs    being referred for hospice net work  dnd, dni

## 2020-05-08 NOTE — CHART NOTE - NSCHARTNOTEFT_GEN_A_CORE
Palliative Care:    73 y/o woman from [facility], [facility] of dementia, hypothyroidism, presented to ED due to AMS and hypoxia. Pt. is non-verbal, awake, does not respond to tactile/verbal stimuli, unable to obtain history. Was recently treated and discharged for COVID 19.  Repeat CXR shows Biapical pleural thickening. Bilateral airspace opacities overall progressed.  Patient remains on NRB, confused in restraints unable to be weaned.  DNR/DNI on file.  Had lengthy discussion with the patient's son Varinder Cole via phone ([phone]) explained pt's declining progressively - pt's prognosis is poor; clinically appropriate for hospice.   Educated about hospice philosophy and locations of care.    Varinder Cole is agreeable to transfer pt to [facility] for end of life care.  SW referral made. Support provided.  Primary team aware.

## 2020-05-08 NOTE — PROGRESS NOTE ADULT - PROBLEM SELECTOR PLAN 2
> Patient currently on NRB @10L saturating 95%  -Continue with restraints to prevent patient from taking off NRB mask      > Tylenol PRN fever  > Robitussin PRN cough   > Palliative care note seen and appreciated

## 2020-05-08 NOTE — PROGRESS NOTE ADULT - SUBJECTIVE AND OBJECTIVE BOX
Pt Name: SUSANA MADRID  MRN: [id number]      74yFemaleHPI:  Patient seen and evaluated at bedside. Patient confused unable to obtain history or ROS, but states she is breathing well.       PHYSICAL EXAM:    Vital Signs Last 24 Hrs  T(C): 36.3 (08 May 2020 07:47), Max: 36.6 (07 May 2020 15:44)  T(F): 97.4 (08 May 2020 07:47), Max: 97.8 (07 May 2020 15:44)  HR: 100 (08 May 2020 07:47) (89 - 101)  BP: 138/73 (08 May 2020 07:47) (128/64 - 143/60)  BP(mean): --  RR: 20 (08 May 2020 07:47) (20 - 21)  SpO2: 95% (08 May 2020 07:47) (94% - 99%)      Gen: NAD, resting in bed   Extremities: no clubbing/cyanosis, no edema, no calf tenderness  Vascular:  DP/PT 2+ b/l, brisk  Neurological: no focal deficits  Skin: no rash on visible skin        LABS:                          11.8   9.85  )-----------( 247      ( 05 May 2020 10:44 )             35.6   05-05    139  |  104  |  10  ----------------------------<  120<H>  3.5   |  28  |  0.48<L>    Ca    8.7      05 May 2020 10:44

## 2020-05-09 VITALS
DIASTOLIC BLOOD PRESSURE: 69 MMHG | TEMPERATURE: 98 F | HEART RATE: 118 BPM | SYSTOLIC BLOOD PRESSURE: 136 MMHG | OXYGEN SATURATION: 99 % | RESPIRATION RATE: 18 BRPM

## 2020-05-09 DIAGNOSIS — G30.9 ALZHEIMER'S DISEASE, UNSPECIFIED: ICD-10-CM

## 2020-05-09 DIAGNOSIS — J96.01 ACUTE RESPIRATORY FAILURE WITH HYPOXIA: ICD-10-CM

## 2020-05-09 PROCEDURE — 85379 FIBRIN DEGRADATION QUANT: CPT

## 2020-05-09 PROCEDURE — 70450 CT HEAD/BRAIN W/O DYE: CPT

## 2020-05-09 PROCEDURE — 86140 C-REACTIVE PROTEIN: CPT

## 2020-05-09 PROCEDURE — 82728 ASSAY OF FERRITIN: CPT

## 2020-05-09 PROCEDURE — 80053 COMPREHEN METABOLIC PANEL: CPT

## 2020-05-09 PROCEDURE — 87635 SARS-COV-2 COVID-19 AMP PRB: CPT

## 2020-05-09 PROCEDURE — 83605 ASSAY OF LACTIC ACID: CPT

## 2020-05-09 PROCEDURE — 96374 THER/PROPH/DIAG INJ IV PUSH: CPT

## 2020-05-09 PROCEDURE — 84145 PROCALCITONIN (PCT): CPT

## 2020-05-09 PROCEDURE — 83735 ASSAY OF MAGNESIUM: CPT

## 2020-05-09 PROCEDURE — 36415 COLL VENOUS BLD VENIPUNCTURE: CPT

## 2020-05-09 PROCEDURE — 85027 COMPLETE CBC AUTOMATED: CPT

## 2020-05-09 PROCEDURE — 87086 URINE CULTURE/COLONY COUNT: CPT

## 2020-05-09 PROCEDURE — 84443 ASSAY THYROID STIM HORMONE: CPT

## 2020-05-09 PROCEDURE — 82607 VITAMIN B-12: CPT

## 2020-05-09 PROCEDURE — 99285 EMERGENCY DEPT VISIT HI MDM: CPT | Mod: 25

## 2020-05-09 PROCEDURE — 84100 ASSAY OF PHOSPHORUS: CPT

## 2020-05-09 PROCEDURE — 83036 HEMOGLOBIN GLYCOSYLATED A1C: CPT

## 2020-05-09 PROCEDURE — 93005 ELECTROCARDIOGRAM TRACING: CPT

## 2020-05-09 PROCEDURE — 80048 BASIC METABOLIC PNL TOTAL CA: CPT

## 2020-05-09 PROCEDURE — 71045 X-RAY EXAM CHEST 1 VIEW: CPT

## 2020-05-09 PROCEDURE — 80061 LIPID PANEL: CPT

## 2020-05-09 PROCEDURE — 74019 RADEX ABDOMEN 2 VIEWS: CPT

## 2020-05-09 PROCEDURE — 82306 VITAMIN D 25 HYDROXY: CPT

## 2020-05-09 PROCEDURE — 83615 LACTATE (LD) (LDH) ENZYME: CPT

## 2020-05-09 PROCEDURE — 81001 URINALYSIS AUTO W/SCOPE: CPT

## 2020-05-09 RX ADMIN — Medication 1 MILLIGRAM(S): at 18:15

## 2020-05-09 RX ADMIN — Medication 1 MILLIGRAM(S): at 03:35

## 2020-05-09 NOTE — PROGRESS NOTE ADULT - SUBJECTIVE AND OBJECTIVE BOX
Pt Name: SUSANA MADRID  MRN: [id number]      74yFemaleHPI:  Patient seen and evaluated at bedside. Patient confused unable to obtain history or ROS, but states she is breathing well.       PHYSICAL EXAM:    T(C): 36.3 (05-09-20 @ 07:52), Max: 36.3 (05-09-20 @ 07:52)  HR: 85 (05-09-20 @ 07:52) (85 - 105)  BP: 112/58 (05-09-20 @ 07:52) (112/58 - 133/76)  RR: 17 (05-09-20 @ 07:52) (17 - 17)  SpO2: 100% (05-09-20 @ 07:52) (100% - 100%)      Gen: NAD, resting in bed   Extremities: no clubbing/cyanosis, no edema, no calf tenderness  Vascular:  DP/PT 2+ b/l, brisk  Neurological: no focal deficits  Skin: no rash on visible skin

## 2020-05-09 NOTE — DISCHARGE NOTE PROVIDER - NSDCCPCAREPLAN_GEN_ALL_CORE_FT
PRINCIPAL DISCHARGE DIAGNOSIS  Diagnosis: Altered mental status  Assessment and Plan of Treatment: Patient was admitted for altered mental status likely due to COVID 19 infection and hypernatremia. improved with treatment.   Follow up with facilty MD after discharge.      SECONDARY DISCHARGE DIAGNOSES  Diagnosis: Acute hypoxemic respiratory failure  Assessment and Plan of Treatment: You were treated for COVID 19 infection. Requires oxygen therapy via non rebreather mask. Can titrate to nasal cannula when necessary.   Continue monitoring oxygen saturation.   Pt was treated with prn morphine IV and prn Ativan IV for sob, respiratory distress. discontinued on discharge.   Please follow up for facility MD for Morphine if necessary for shortness of breath or respiratory distress.    Diagnosis: 2019 novel coronavirus disease (COVID-19)  Assessment and Plan of Treatment: You were treated for COVID 19 viral infection.   Continue oxygen therapy via non rebreather mask. can titrate to nasal cannula.   Follow CORONAVIRUS INSTRUCTIONS: Based on your current clinical status and stability, it has been determined that you no longer need hospitalization and can recover while remaining in self-quarantine at home. You should follow the prevention steps below until a healthcare provider or local or state health department says you can return to your normal activities. 1. You should restrict activities outside your home, except for getting medical care. 2. Do not go to work, school, or public areas. 3. Avoid using public transportation, ride-sharing, or taxis. 4. Separate yourself from other people and animals in your home as much as possible.  When you are around other people (e.g., sharing a room or vehicle) you should wear a facemask.  5. Wash your hands often with soap and water for at least 20 seconds, especially after blowing your nose, coughing, or sneezing; going to the bathroom; and before eating or preparing food.6. Cover your mouth and nose with a tissue when you cough or sneeze. Throw used tissues in a lined trash can. Immediately wash your hands with soap and water for at least 20 seconds7. High touch surfaces include counters, tabletops, doorknobs, bathroom fixtures, toilets, phones, keyboards, tablets, and bedside tables.8. Avoid sharing dishes, drinking glasses, cups, eating utensils, towels, or bedding with other people or pets in your home. After using these items, they should be washed thoroughly with soap and water.You are strongly advised to seek prompt medical attention if your illness worsens or you develop new symptoms like fever or difficulty breathing.   Follow up with facility MD for titrate oxygen therapy.       Diagnosis: Hypernatremia  Assessment and Plan of Treatment: You were treated for high sodium level in your blood and resolved.   Follow up with facility MD.    Diagnosis: Alzheimer's dementia without behavioral disturbance, unspecified timing of dementia onset  Assessment and Plan of Treatment: Pt is noted advanced dementia.   continue supportive care. maintain skin integrity.  Aspiration precaution and fall precaution.   Pt was on intermittent non secured mittens for behavior disturbances, attempts to remove non rebreather mask. been off when pt is calm and cooperative.   Monitor behavior.  Patient was followed by palliative team. family agreed DNR/DNI. comfort care only.    Follow up with facility MD with hospice team. PRINCIPAL DISCHARGE DIAGNOSIS  Diagnosis: Altered mental status  Assessment and Plan of Treatment: Patient was admitted for altered mental status likely due to COVID 19 infection and hypernatremia. improved with treatment.   Follow up with facilty MD after discharge.      SECONDARY DISCHARGE DIAGNOSES  Diagnosis: Acute hypoxemic respiratory failure  Assessment and Plan of Treatment: Hypoxia likely due to COVID 19 infection. Requires oxygen therapy via non rebreather mask 10L. saturating %.   Can titrate to nasal cannula when necessary.   Continue monitoring oxygen saturation.   Pt was treated with prn morphine IV and prn Ativan IV for sob, respiratory distress. discontinued on discharge.   Please follow up for facility MD for Morphine if necessary for shortness of breath or respiratory distress.    Diagnosis: 2019 novel coronavirus disease (COVID-19)  Assessment and Plan of Treatment: You were treated for COVID 19 viral infection.   Continue oxygen therapy via non rebreather mask. can titrate to nasal cannula.   Follow CORONAVIRUS INSTRUCTIONS: Based on your current clinical status and stability, it has been determined that you no longer need hospitalization and can recover while remaining in self-quarantine at home. You should follow the prevention steps below until a healthcare provider or local or state health department says you can return to your normal activities. 1. You should restrict activities outside your home, except for getting medical care. 2. Do not go to work, school, or public areas. 3. Avoid using public transportation, ride-sharing, or taxis. 4. Separate yourself from other people and animals in your home as much as possible.  When you are around other people (e.g., sharing a room or vehicle) you should wear a facemask.  5. Wash your hands often with soap and water for at least 20 seconds, especially after blowing your nose, coughing, or sneezing; going to the bathroom; and before eating or preparing food.6. Cover your mouth and nose with a tissue when you cough or sneeze. Throw used tissues in a lined trash can. Immediately wash your hands with soap and water for at least 20 seconds7. High touch surfaces include counters, tabletops, doorknobs, bathroom fixtures, toilets, phones, keyboards, tablets, and bedside tables.8. Avoid sharing dishes, drinking glasses, cups, eating utensils, towels, or bedding with other people or pets in your home. After using these items, they should be washed thoroughly with soap and water.You are strongly advised to seek prompt medical attention if your illness worsens or you develop new symptoms like fever or difficulty breathing.   Follow up with facility MD for titrate oxygen therapy.       Diagnosis: Hypernatremia  Assessment and Plan of Treatment: You were treated for high sodium level in your blood and resolved.   Follow up with facility MD.    Diagnosis: Alzheimer's dementia without behavioral disturbance, unspecified timing of dementia onset  Assessment and Plan of Treatment: Pt is noted advanced dementia.   continue supportive care. maintain skin integrity.  Aspiration precaution and fall precaution.   Pt was on intermittent non secured mittens for behavior disturbances, attempts to remove non rebreather mask. been off when pt is calm and cooperative.   Pt was treated with prn Ativan IV for increased agitation. Monitor behavior.  Patient was followed by palliative team. family agreed DNR/DNI. comfort care only.    Follow up with facility MD with hospice team. PRINCIPAL DISCHARGE DIAGNOSIS  Diagnosis: Altered mental status  Assessment and Plan of Treatment: Patient was admitted for altered mental status likely due to COVID 19 infection and hypernatremia. improved with treatment.   [facility]      SECONDARY DISCHARGE DIAGNOSES  Diagnosis: Acute hypoxemic respiratory failure  Assessment and Plan of Treatment: Hypoxia likely due to COVID 19 infection. Requires oxygen therapy via non rebreather mask 10L. saturating %.   Can titrate to nasal cannula when necessary.   Continue monitoring oxygen saturation.   Please follow up for facility MD for Morphine prn if necessary for shortness of breath or respiratory distress.    Diagnosis: 2019 novel coronavirus disease (COVID-19)  Assessment and Plan of Treatment: You were treated for COVID 19 viral infection.   Continue oxygen therapy via non rebreather mask. can titrate to nasal cannula.   Follow CORONAVIRUS INSTRUCTIONS: Based on your current clinical status and stability, it has been determined that you no longer need hospitalization and can recover while remaining in self-quarantine at home. You should follow the prevention steps below until a healthcare provider or local or state health department says you can return to your normal activities. 1. You should restrict activities outside your home, except for getting medical care. 2. Do not go to work, school, or public areas. 3. Avoid using public transportation, ride-sharing, or taxis. 4. Separate yourself from other people and animals in your home as much as possible.  When you are around other people (e.g., sharing a room or vehicle) you should wear a facemask.  5. Wash your hands often with soap and water for at least 20 seconds, especially after blowing your nose, coughing, or sneezing; going to the bathroom; and before eating or preparing food.6. Cover your mouth and nose with a tissue when you cough or sneeze. Throw used tissues in a lined trash can. Immediately wash your hands with soap and water for at least 20 seconds7. High touch surfaces include counters, tabletops, doorknobs, bathroom fixtures, toilets, phones, keyboards, tablets, and bedside tables.8. Avoid sharing dishes, drinking glasses, cups, eating utensils, towels, or bedding with other people or pets in your home. After using these items, they should be washed thoroughly with soap and water.You are strongly advised to seek prompt medical attention if your illness worsens or you develop new symptoms like fever or difficulty breathing.   Follow up with facility MD for titrate oxygen therapy.       Diagnosis: Hypernatremia  Assessment and Plan of Treatment: You were treated for high sodium level in your blood and resolved.   Follow up with facility MD.    Diagnosis: Alzheimer's dementia without behavioral disturbance, unspecified timing of dementia onset  Assessment and Plan of Treatment: Pt is noted advanced dementia.   continue supportive care. maintain skin integrity.  Aspiration precaution and fall precaution.   Pt was on intermittent non secured mittens for behavior disturbances, attempts to remove non rebreather mask. been off when pt is calm and cooperative.   Pt received prn Ativan IV for increased agitation. Monitor behavior.  Patient was followed by palliative team. family agreed DNR/DNI. comfort care only.    Follow up with facility MD with hospice team.

## 2020-05-09 NOTE — GOALS OF CARE CONVERSATION - ADVANCED CARE PLANNING - CAREGIVER NAME
[facility] form [facility]
patient was a resident at the [facility] and most recently admitted form [facility]

## 2020-05-09 NOTE — PROGRESS NOTE ADULT - REASON FOR ADMISSION
AMS and hypoxia

## 2020-05-09 NOTE — CHART NOTE - NSCHARTNOTEFT_GEN_A_CORE
pt requires oxygen therapy via NRB mask. keeps removing oxygen. renewed unsecured mittens for therapeutic treatment. will monitor for behavior.

## 2020-05-09 NOTE — DISCHARGE NOTE PROVIDER - HOSPITAL COURSE
A 74F from [facility], [facility] of dementia, hypothyroidism, presented to ED due to AMS and hypoxia. Pt. is non-verbal, awake, does not respond to tactile/verbal stimuli, unable to obtain history. Pt. was discharged on 27th April after being treated for COVID 19 infection. As per NH chart, pt. was sent due to altered mental status with hypoxia. Pt was saturating 95% on NRB on arrival in the ED. Pt is admitted AMS likely due to hypernatremia and COVID infection (resulted on 4/30). CT head negative for acute pathology. Pt was treated for hypernatremia and. resolved. Family understands the poor prognosis and wants her to be comfortable as possible. Decision is made for DNR/DNI. comfort care only. Palliative followed.  Pt is more alert and responds to stimuli. requires NRB mask saturating 92 to 100%. unable to tolerate with nasal cannula with pO2 sat < 90%.  Pt attempts to remove non rebreather mask frequently. was on intermittent unsecured bilateral mittens for therapeutic treatment. Pt has been calm and cooperative.     Pt is medically optimized for discharge to LTC hospice with non rebreather. Confirmed with nurse Bowles at [facility] ( [phone]) and facility accepts patient with NRB and unsecured mittens. CM and SW made aware.     Discharge planning discussed with attending on agreement. A 74F from [facility], [facility] of dementia, hypothyroidism, presented to ED due to AMS and hypoxia. Pt. is non-verbal, awake, does not respond to tactile/verbal stimuli, unable to obtain history. Pt. was discharged on 27th April after being treated for COVID 19 infection. As per NH chart, pt. was sent due to altered mental status with hypoxia. Pt was saturating 95% on NRB on arrival in the ED. Pt is admitted AMS likely due to hypernatremia and COVID infection (resulted on 4/30). CT head negative for acute pathology. Pt was treated for hypernatremia and. resolved. Family understands the poor prognosis and wants her to be comfortable as possible. Decision is made for DNR/DNI. comfort care only. Palliative followed.  Pt is more alert and responds to stimuli. requires oxygen via NRB mask (10L) saturating 92 to 100%. unable to tolerate with nasal cannula with pO2 sat < 90%.  Pt attempts to remove non rebreather mask frequently. was on intermittent unsecured bilateral mittens for therapeutic treatment. Pt has been calm and cooperative.     Pt is medically optimized for discharge to LTC hospice with non rebreather. Confirmed with nurse Bowles at [facility] ( [phone]) and facility accepts patient with NRB and unsecured mittens. CM and SW made aware.     Discharge planning discussed with attending on agreement. A 74F from [facility], [facility] of dementia, hypothyroidism, presented to ED due to AMS and hypoxia. Pt. is non-verbal, awake, does not respond to tactile/verbal stimuli, unable to obtain history. Pt. was discharged on 27th April after being treated for COVID 19 infection. As per NH chart, pt. was sent due to altered mental status with hypoxia. Pt was saturating 95% on NRB on arrival in the ED. Pt is admitted AMS likely due to hypernatremia and COVID infection (resulted on 4/30). CT head negative for acute pathology. Pt was treated for hypernatremia and. resolved. Family understands the poor prognosis and wants her to be comfortable as possible. Decision is made for DNR/DNI. comfort care only. Palliative followed.  Pt is more alert and responds to stimuli. requires oxygen via NRB mask (10L) saturating 92 to 100%. unable to tolerate with nasal cannula with pO2 sat < 90%.  Pt attempts to remove non rebreather mask frequently. was on intermittent unsecured bilateral mittens for therapeutic treatment. Pt has been calm and cooperative. receive prn Ativan for increased agitation.     Pt is medically optimized for discharge to LTC hospice with non rebreather. Confirmed with nurse Bowles at [facility] ( [phone]) and facility accepts patient with NRB and unsecured mittens. CM and SW made aware.     Discharge planning discussed with attending on agreement.

## 2020-05-09 NOTE — GOALS OF CARE CONVERSATION - ADVANCED CARE PLANNING - CONVERSATION DETAILS
[facility]    Referral received from GANGA Marisol Leaestela on 5/8/2020 for inpatient hospice at \[facility]\"". [facility] MD approval received and consents reviewed, signed and received by [facility].     Patient APPROVED  for inpatient level of care at \[facility]\"" pending bed availability, clinical stability for transport to [facility] and IV accessibility .Patient placed on board/wait list for this weekend.     This writer provided son the  contact name, number and address of Hospice [facility]. We  discussed hospice service and visiting hours at \[facility]\"" with repeat understanding. Son is agreeable to \[facility]\""   for patient but does not want patient moved if unstable     Please contact [facility] Referral Center at [phone] for further coordination on this referral or  contact the  \[facility]\"" directly [phone] for bed availability  and to provide RN to RN report piror to discharge, Please leave any functioning IVs  intact    Thank you for this referral.    Rosanna Jimenez RN, CHPN, OCN  Hospice Inpatient Specialist  [phone]

## 2020-05-09 NOTE — PROGRESS NOTE ADULT - PROBLEM SELECTOR PLAN 2
> Patient currently on NRB @10L saturating 95%  -Continue with restraints to prevent patient from taking off NRB mask  - Trial of NC Oxygen       > Tylenol PRN fever  > Robitussin PRN cough   > Palliative care note seen and appreciated

## 2024-12-27 NOTE — DIETITIAN INITIAL EVALUATION ADULT. - SIGNS/SYMPTOMS
- for thick nasal mucus: use mucinex, flonase, daily allergy medication and nasal rinses  
Likely inadequate oral intake PTA, inhouse intake 40-50% with feeding assistance & followed by SLP